# Patient Record
Sex: FEMALE | NOT HISPANIC OR LATINO | URBAN - METROPOLITAN AREA
[De-identification: names, ages, dates, MRNs, and addresses within clinical notes are randomized per-mention and may not be internally consistent; named-entity substitution may affect disease eponyms.]

---

## 2024-10-21 ENCOUNTER — APPOINTMENT (OUTPATIENT)
Dept: PRIMARY CARE | Facility: CLINIC | Age: 54
End: 2024-10-21
Payer: COMMERCIAL

## 2024-10-23 ENCOUNTER — OFFICE VISIT (OUTPATIENT)
Dept: PRIMARY CARE | Facility: CLINIC | Age: 54
End: 2024-10-23
Payer: COMMERCIAL

## 2024-10-23 VITALS
RESPIRATION RATE: 16 BRPM | OXYGEN SATURATION: 97 % | TEMPERATURE: 95.1 F | SYSTOLIC BLOOD PRESSURE: 158 MMHG | BODY MASS INDEX: 30.62 KG/M2 | DIASTOLIC BLOOD PRESSURE: 100 MMHG | HEART RATE: 81 BPM | WEIGHT: 202 LBS | HEIGHT: 68 IN

## 2024-10-23 DIAGNOSIS — Z00.00 ROUTINE GENERAL MEDICAL EXAMINATION AT A HEALTH CARE FACILITY: ICD-10-CM

## 2024-10-23 DIAGNOSIS — E66.09 CLASS 1 OBESITY DUE TO EXCESS CALORIES WITH SERIOUS COMORBIDITY AND BODY MASS INDEX (BMI) OF 30.0 TO 30.9 IN ADULT: ICD-10-CM

## 2024-10-23 DIAGNOSIS — E66.811 CLASS 1 OBESITY DUE TO EXCESS CALORIES WITH SERIOUS COMORBIDITY AND BODY MASS INDEX (BMI) OF 30.0 TO 30.9 IN ADULT: ICD-10-CM

## 2024-10-23 DIAGNOSIS — Z00.00 ROUTINE MEDICAL EXAM: Primary | ICD-10-CM

## 2024-10-23 DIAGNOSIS — R00.2 PALPITATIONS: ICD-10-CM

## 2024-10-23 DIAGNOSIS — I10 PRIMARY HYPERTENSION: ICD-10-CM

## 2024-10-23 LAB
ALBUMIN SERPL BCP-MCNC: 4.4 G/DL (ref 3.4–5)
ALP SERPL-CCNC: 97 U/L (ref 33–110)
ALT SERPL W P-5'-P-CCNC: 17 U/L (ref 7–45)
ANION GAP SERPL CALCULATED.3IONS-SCNC: 9 MMOL/L (ref 10–20)
AST SERPL W P-5'-P-CCNC: 16 U/L (ref 9–39)
BASOPHILS # BLD AUTO: 0.04 X10*3/UL (ref 0–0.1)
BASOPHILS NFR BLD AUTO: 0.7 %
BILIRUB SERPL-MCNC: 0.6 MG/DL (ref 0–1.2)
BUN SERPL-MCNC: 15 MG/DL (ref 6–23)
CALCIUM SERPL-MCNC: 10.1 MG/DL (ref 8.6–10.3)
CHLORIDE SERPL-SCNC: 105 MMOL/L (ref 98–107)
CHOLEST SERPL-MCNC: 188 MG/DL (ref 0–199)
CHOLEST/HDLC SERPL: 3.7 {RATIO}
CO2 SERPL-SCNC: 30 MMOL/L (ref 21–32)
CREAT SERPL-MCNC: 0.71 MG/DL (ref 0.5–1.05)
EGFRCR SERPLBLD CKD-EPI 2021: >90 ML/MIN/1.73M*2
EOSINOPHIL # BLD AUTO: 0.22 X10*3/UL (ref 0–0.7)
EOSINOPHIL NFR BLD AUTO: 3.8 %
ERYTHROCYTE [DISTWIDTH] IN BLOOD BY AUTOMATED COUNT: 12.7 % (ref 11.5–14.5)
GLUCOSE SERPL-MCNC: 96 MG/DL (ref 74–99)
HCT VFR BLD AUTO: 47.2 % (ref 36–46)
HDLC SERPL-MCNC: 50.8 MG/DL
HGB BLD-MCNC: 15.7 G/DL (ref 12–16)
IMM GRANULOCYTES # BLD AUTO: 0.01 X10*3/UL (ref 0–0.7)
IMM GRANULOCYTES NFR BLD AUTO: 0.2 % (ref 0–0.9)
LDLC SERPL CALC-MCNC: 113 MG/DL
LYMPHOCYTES # BLD AUTO: 1.57 X10*3/UL (ref 1.2–4.8)
LYMPHOCYTES NFR BLD AUTO: 27.4 %
MCH RBC QN AUTO: 30.6 PG (ref 26–34)
MCHC RBC AUTO-ENTMCNC: 33.3 G/DL (ref 32–36)
MCV RBC AUTO: 92 FL (ref 80–100)
MONOCYTES # BLD AUTO: 0.42 X10*3/UL (ref 0.1–1)
MONOCYTES NFR BLD AUTO: 7.3 %
NEUTROPHILS # BLD AUTO: 3.47 X10*3/UL (ref 1.2–7.7)
NEUTROPHILS NFR BLD AUTO: 60.6 %
NON HDL CHOLESTEROL: 137 MG/DL (ref 0–149)
NRBC BLD-RTO: 0 /100 WBCS (ref 0–0)
PLATELET # BLD AUTO: 237 X10*3/UL (ref 150–450)
POTASSIUM SERPL-SCNC: 4.1 MMOL/L (ref 3.5–5.3)
PROT SERPL-MCNC: 6.8 G/DL (ref 6.4–8.2)
RBC # BLD AUTO: 5.13 X10*6/UL (ref 4–5.2)
SODIUM SERPL-SCNC: 140 MMOL/L (ref 136–145)
TRIGL SERPL-MCNC: 123 MG/DL (ref 0–149)
TSH SERPL-ACNC: 0.78 MIU/L (ref 0.44–3.98)
VLDL: 25 MG/DL (ref 0–40)
WBC # BLD AUTO: 5.7 X10*3/UL (ref 4.4–11.3)

## 2024-10-23 PROCEDURE — 3080F DIAST BP >= 90 MM HG: CPT | Performed by: FAMILY MEDICINE

## 2024-10-23 PROCEDURE — 80061 LIPID PANEL: CPT | Performed by: FAMILY MEDICINE

## 2024-10-23 PROCEDURE — 84443 ASSAY THYROID STIM HORMONE: CPT | Performed by: FAMILY MEDICINE

## 2024-10-23 PROCEDURE — 80053 COMPREHEN METABOLIC PANEL: CPT | Performed by: FAMILY MEDICINE

## 2024-10-23 PROCEDURE — 99203 OFFICE O/P NEW LOW 30 MIN: CPT | Performed by: FAMILY MEDICINE

## 2024-10-23 PROCEDURE — 99386 PREV VISIT NEW AGE 40-64: CPT | Performed by: FAMILY MEDICINE

## 2024-10-23 PROCEDURE — 3077F SYST BP >= 140 MM HG: CPT | Performed by: FAMILY MEDICINE

## 2024-10-23 PROCEDURE — 85025 COMPLETE CBC W/AUTO DIFF WBC: CPT | Performed by: FAMILY MEDICINE

## 2024-10-23 PROCEDURE — 36415 COLL VENOUS BLD VENIPUNCTURE: CPT | Performed by: FAMILY MEDICINE

## 2024-10-23 PROCEDURE — 3008F BODY MASS INDEX DOCD: CPT | Performed by: FAMILY MEDICINE

## 2024-10-23 RX ORDER — ALBUTEROL SULFATE 90 UG/1
INHALANT RESPIRATORY (INHALATION)
COMMUNITY
Start: 2024-04-11

## 2024-10-23 RX ORDER — DILTIAZEM HYDROCHLORIDE 240 MG/1
240 CAPSULE, COATED, EXTENDED RELEASE ORAL DAILY
COMMUNITY
Start: 2023-05-16 | End: 2024-10-23 | Stop reason: WASHOUT

## 2024-10-23 RX ORDER — LOSARTAN POTASSIUM 50 MG/1
50 TABLET ORAL DAILY
Qty: 30 TABLET | Refills: 11 | Status: SHIPPED | OUTPATIENT
Start: 2024-10-23 | End: 2025-10-23

## 2024-10-23 ASSESSMENT — ENCOUNTER SYMPTOMS
OCCASIONAL FEELINGS OF UNSTEADINESS: 0
NEUROLOGICAL NEGATIVE: 1
DEPRESSION: 0
CARDIOVASCULAR NEGATIVE: 1
MUSCULOSKELETAL NEGATIVE: 1
GASTROINTESTINAL NEGATIVE: 1
CONSTITUTIONAL NEGATIVE: 1
RESPIRATORY NEGATIVE: 1
LOSS OF SENSATION IN FEET: 0

## 2024-10-23 ASSESSMENT — COLUMBIA-SUICIDE SEVERITY RATING SCALE - C-SSRS
2. HAVE YOU ACTUALLY HAD ANY THOUGHTS OF KILLING YOURSELF?: NO
6. HAVE YOU EVER DONE ANYTHING, STARTED TO DO ANYTHING, OR PREPARED TO DO ANYTHING TO END YOUR LIFE?: NO
1. IN THE PAST MONTH, HAVE YOU WISHED YOU WERE DEAD OR WISHED YOU COULD GO TO SLEEP AND NOT WAKE UP?: NO

## 2024-10-23 ASSESSMENT — PATIENT HEALTH QUESTIONNAIRE - PHQ9
2. FEELING DOWN, DEPRESSED OR HOPELESS: NOT AT ALL
1. LITTLE INTEREST OR PLEASURE IN DOING THINGS: NOT AT ALL
SUM OF ALL RESPONSES TO PHQ9 QUESTIONS 1 AND 2: 0

## 2024-10-23 ASSESSMENT — PAIN SCALES - GENERAL: PAINLEVEL_OUTOF10: 0-NO PAIN

## 2024-10-23 NOTE — PROGRESS NOTES
"Subjective   Patient ID: Makenna Reyes is a 54 y.o. female who presents for New Patient Visit (Pt is here to establish care and has concerns of hypertension.).    HPI no chest pain or sob.  She has numerous elevated bp readings recently.      Review of Systems   Constitutional: Negative.    HENT: Negative.     Respiratory: Negative.     Cardiovascular: Negative.    Gastrointestinal: Negative.    Genitourinary: Negative.    Musculoskeletal: Negative.    Neurological: Negative.        Objective   BP (!) 158/100 (BP Location: Left arm, Patient Position: Sitting, BP Cuff Size: Adult)   Pulse 81   Temp 35.1 °C (95.1 °F) (Temporal)   Resp 16   Ht 1.727 m (5' 8\")   Wt 91.6 kg (202 lb)   SpO2 97%   BMI 30.71 kg/m²     Physical Exam  Vitals and nursing note reviewed.   Constitutional:       General: She is not in acute distress.  HENT:      Right Ear: Tympanic membrane and ear canal normal.      Left Ear: Tympanic membrane and ear canal normal.      Nose: Nose normal. No rhinorrhea.      Mouth/Throat:      Pharynx: Oropharynx is clear. No oropharyngeal exudate or posterior oropharyngeal erythema.      Comments: Dentition wnl  Eyes:      Extraocular Movements: Extraocular movements intact.      Conjunctiva/sclera: Conjunctivae normal.      Pupils: Pupils are equal, round, and reactive to light.   Neck:      Vascular: No carotid bruit.   Cardiovascular:      Rate and Rhythm: Normal rate and regular rhythm.      Heart sounds: Normal heart sounds. No murmur heard.  Pulmonary:      Breath sounds: Normal breath sounds. No wheezing or rhonchi.   Abdominal:      General: Bowel sounds are normal. There is no distension.      Palpations: Abdomen is soft. There is no mass.      Tenderness: There is no abdominal tenderness. There is no guarding or rebound.      Hernia: No hernia is present.   Musculoskeletal:         General: No swelling or tenderness. Normal range of motion.      Cervical back: Normal range of motion and neck " supple.   Lymphadenopathy:      Cervical: No cervical adenopathy.   Skin:     General: Skin is warm.      Findings: No rash.   Neurological:      General: No focal deficit present.      Mental Status: She is alert.         Assessment/Plan   Problem List Items Addressed This Visit    None  Visit Diagnoses         Codes    Routine medical exam    -  Primary Z00.00    Relevant Orders    CBC and Auto Differential (Completed)    Comprehensive Metabolic Panel (Completed)    TSH with reflex to Free T4 if abnormal (Completed)    Lipid Panel (Completed)    CT cardiac scoring wo IV contrast    Routine general medical examination at a health care facility     Z00.00    Primary hypertension     I10    Relevant Medications    losartan (Cozaar) 50 mg tablet    Other Relevant Orders    Referral to Nutrition Services    Referral to Cardiology    CBC and Auto Differential (Completed)    Comprehensive Metabolic Panel (Completed)    Lipid Panel (Completed)    Palpitations     R00.2    Relevant Orders    Referral to Cardiology    TSH with reflex to Free T4 if abnormal (Completed)    Class 1 obesity due to excess calories with serious comorbidity and body mass index (BMI) of 30.0 to 30.9 in adult     E66.811, E66.09, Z68.30    Relevant Orders    Referral to Nutrition Services        Work on good diet and wt loss and will set up with dietary consult.  Keep bp log and recheck 4-6 wks with log

## 2024-11-05 ENCOUNTER — APPOINTMENT (OUTPATIENT)
Dept: RADIOLOGY | Facility: CLINIC | Age: 54
End: 2024-11-05
Payer: COMMERCIAL

## 2024-11-08 ENCOUNTER — APPOINTMENT (OUTPATIENT)
Dept: NEPHROLOGY | Facility: CLINIC | Age: 54
End: 2024-11-08
Payer: COMMERCIAL

## 2024-11-19 ENCOUNTER — HOSPITAL ENCOUNTER (OUTPATIENT)
Dept: RADIOLOGY | Facility: HOSPITAL | Age: 54
Discharge: HOME | End: 2024-11-19
Payer: COMMERCIAL

## 2024-11-19 DIAGNOSIS — Z00.00 ROUTINE MEDICAL EXAM: ICD-10-CM

## 2024-11-19 PROCEDURE — 75571 CT HRT W/O DYE W/CA TEST: CPT

## 2024-11-22 ENCOUNTER — APPOINTMENT (OUTPATIENT)
Dept: RADIOLOGY | Facility: HOSPITAL | Age: 54
End: 2024-11-22
Payer: COMMERCIAL

## 2024-12-02 ENCOUNTER — APPOINTMENT (OUTPATIENT)
Dept: PRIMARY CARE | Facility: CLINIC | Age: 54
End: 2024-12-02
Payer: COMMERCIAL

## 2024-12-04 ENCOUNTER — APPOINTMENT (OUTPATIENT)
Dept: RADIOLOGY | Facility: CLINIC | Age: 54
End: 2024-12-04
Payer: COMMERCIAL

## 2024-12-13 ENCOUNTER — APPOINTMENT (OUTPATIENT)
Dept: PRIMARY CARE | Facility: CLINIC | Age: 54
End: 2024-12-13
Payer: COMMERCIAL

## 2024-12-13 ENCOUNTER — HOSPITAL ENCOUNTER (OUTPATIENT)
Dept: RADIOLOGY | Facility: CLINIC | Age: 54
Discharge: HOME | End: 2024-12-13
Payer: COMMERCIAL

## 2024-12-13 VITALS — HEIGHT: 68 IN | WEIGHT: 202 LBS | BODY MASS INDEX: 30.62 KG/M2

## 2024-12-13 DIAGNOSIS — Z12.31 ENCOUNTER FOR SCREENING MAMMOGRAM FOR MALIGNANT NEOPLASM OF BREAST: ICD-10-CM

## 2024-12-13 PROCEDURE — 77063 BREAST TOMOSYNTHESIS BI: CPT

## 2024-12-19 ENCOUNTER — APPOINTMENT (OUTPATIENT)
Dept: NUTRITION | Facility: HOSPITAL | Age: 54
End: 2024-12-19
Payer: COMMERCIAL

## 2024-12-20 ENCOUNTER — OFFICE VISIT (OUTPATIENT)
Dept: PRIMARY CARE | Facility: CLINIC | Age: 54
End: 2024-12-20
Payer: COMMERCIAL

## 2024-12-20 VITALS
TEMPERATURE: 97.5 F | RESPIRATION RATE: 16 BRPM | OXYGEN SATURATION: 97 % | HEIGHT: 68 IN | WEIGHT: 205 LBS | HEART RATE: 71 BPM | BODY MASS INDEX: 31.07 KG/M2 | DIASTOLIC BLOOD PRESSURE: 110 MMHG | SYSTOLIC BLOOD PRESSURE: 168 MMHG

## 2024-12-20 DIAGNOSIS — I10 PRIMARY HYPERTENSION: Primary | ICD-10-CM

## 2024-12-20 DIAGNOSIS — Z23 ENCOUNTER FOR IMMUNIZATION: ICD-10-CM

## 2024-12-20 PROCEDURE — 3080F DIAST BP >= 90 MM HG: CPT | Performed by: FAMILY MEDICINE

## 2024-12-20 PROCEDURE — 90656 IIV3 VACC NO PRSV 0.5 ML IM: CPT | Performed by: FAMILY MEDICINE

## 2024-12-20 PROCEDURE — 90471 IMMUNIZATION ADMIN: CPT | Performed by: FAMILY MEDICINE

## 2024-12-20 PROCEDURE — 1036F TOBACCO NON-USER: CPT | Performed by: FAMILY MEDICINE

## 2024-12-20 PROCEDURE — 3008F BODY MASS INDEX DOCD: CPT | Performed by: FAMILY MEDICINE

## 2024-12-20 PROCEDURE — 90677 PCV20 VACCINE IM: CPT | Performed by: FAMILY MEDICINE

## 2024-12-20 PROCEDURE — 3077F SYST BP >= 140 MM HG: CPT | Performed by: FAMILY MEDICINE

## 2024-12-20 PROCEDURE — 90472 IMMUNIZATION ADMIN EACH ADD: CPT | Performed by: FAMILY MEDICINE

## 2024-12-20 PROCEDURE — 99213 OFFICE O/P EST LOW 20 MIN: CPT | Performed by: FAMILY MEDICINE

## 2024-12-20 PROCEDURE — 90715 TDAP VACCINE 7 YRS/> IM: CPT | Performed by: FAMILY MEDICINE

## 2024-12-20 RX ORDER — LOSARTAN POTASSIUM 50 MG/1
100 TABLET ORAL DAILY
Qty: 90 TABLET | Refills: 1 | Status: SHIPPED | OUTPATIENT
Start: 2024-12-20 | End: 2025-12-20

## 2024-12-20 ASSESSMENT — ENCOUNTER SYMPTOMS
CONSTITUTIONAL NEGATIVE: 1
LOSS OF SENSATION IN FEET: 0
MUSCULOSKELETAL NEGATIVE: 1
GASTROINTESTINAL NEGATIVE: 1
DEPRESSION: 0
RESPIRATORY NEGATIVE: 1
CARDIOVASCULAR NEGATIVE: 1
NEUROLOGICAL NEGATIVE: 1
OCCASIONAL FEELINGS OF UNSTEADINESS: 0

## 2024-12-20 ASSESSMENT — COLUMBIA-SUICIDE SEVERITY RATING SCALE - C-SSRS
1. IN THE PAST MONTH, HAVE YOU WISHED YOU WERE DEAD OR WISHED YOU COULD GO TO SLEEP AND NOT WAKE UP?: NO
2. HAVE YOU ACTUALLY HAD ANY THOUGHTS OF KILLING YOURSELF?: NO
6. HAVE YOU EVER DONE ANYTHING, STARTED TO DO ANYTHING, OR PREPARED TO DO ANYTHING TO END YOUR LIFE?: NO

## 2024-12-20 ASSESSMENT — PAIN SCALES - GENERAL: PAINLEVEL_OUTOF10: 0-NO PAIN

## 2024-12-20 NOTE — PROGRESS NOTES
"Subjective   Patient ID: Makenna Reyes is a 54 y.o. female who presents for Follow-up (Pt is here for a follow up of bp.).    HPI she does not check her bps and does not exercise. She does take her meds mostly.  She has dietary consult upcoming.     Review of Systems   Constitutional: Negative.    HENT: Negative.     Respiratory: Negative.     Cardiovascular: Negative.    Gastrointestinal: Negative.    Genitourinary: Negative.    Musculoskeletal: Negative.    Neurological: Negative.        Objective   BP (!) 168/110 (BP Location: Left arm, Patient Position: Sitting, BP Cuff Size: Adult)   Pulse 71   Temp 36.4 °C (97.5 °F) (Temporal)   Resp 16   Ht 1.727 m (5' 8\")   Wt 93 kg (205 lb)   SpO2 97%   BMI 31.17 kg/m²     Physical Exam  Constitutional:       General: She is not in acute distress.     Appearance: Normal appearance.   Cardiovascular:      Rate and Rhythm: Normal rate and regular rhythm.      Heart sounds: No murmur heard.  Pulmonary:      Breath sounds: Normal breath sounds. No wheezing.   Neurological:      Mental Status: She is alert.         Assessment/Plan   Problem List Items Addressed This Visit    None  Visit Diagnoses         Codes    Encounter for immunization     Z23    Relevant Orders    Flu vaccine, trivalent, preservative free, age 6 months and greater (Fluarix/Fluzone/Flulaval)    Primary hypertension     I10    Relevant Medications    losartan (Cozaar) 50 mg tablet        Discussed importance bp checks and exercise.  Keep log and recheck 2 months.  Increase to 100 mg.         "

## 2024-12-24 ENCOUNTER — APPOINTMENT (OUTPATIENT)
Age: 54
End: 2024-12-24
Payer: COMMERCIAL

## 2024-12-24 VITALS
TEMPERATURE: 98.6 F | OXYGEN SATURATION: 97 % | HEART RATE: 87 BPM | WEIGHT: 204 LBS | BODY MASS INDEX: 30.92 KG/M2 | RESPIRATION RATE: 16 BRPM | HEIGHT: 68 IN | SYSTOLIC BLOOD PRESSURE: 138 MMHG | DIASTOLIC BLOOD PRESSURE: 60 MMHG

## 2024-12-24 DIAGNOSIS — R93.1 AGATSTON CAC SCORE, <100: Primary | ICD-10-CM

## 2024-12-24 DIAGNOSIS — R00.2 PALPITATIONS: ICD-10-CM

## 2024-12-24 DIAGNOSIS — I10 ESSENTIAL HYPERTENSION: ICD-10-CM

## 2024-12-24 DIAGNOSIS — I10 PRIMARY HYPERTENSION: ICD-10-CM

## 2024-12-24 PROCEDURE — 3078F DIAST BP <80 MM HG: CPT | Performed by: INTERNAL MEDICINE

## 2024-12-24 PROCEDURE — 3075F SYST BP GE 130 - 139MM HG: CPT | Performed by: INTERNAL MEDICINE

## 2024-12-24 PROCEDURE — 99204 OFFICE O/P NEW MOD 45 MIN: CPT | Performed by: INTERNAL MEDICINE

## 2024-12-24 PROCEDURE — 93000 ELECTROCARDIOGRAM COMPLETE: CPT | Performed by: INTERNAL MEDICINE

## 2024-12-24 PROCEDURE — 3008F BODY MASS INDEX DOCD: CPT | Performed by: INTERNAL MEDICINE

## 2024-12-24 PROCEDURE — 1036F TOBACCO NON-USER: CPT | Performed by: INTERNAL MEDICINE

## 2024-12-24 ASSESSMENT — LIFESTYLE VARIABLES
HAVE YOU OR SOMEONE ELSE BEEN INJURED AS A RESULT OF YOUR DRINKING: NO
HOW OFTEN DO YOU HAVE SIX OR MORE DRINKS ON ONE OCCASION: NEVER
AUDIT TOTAL SCORE: 0
HAS A RELATIVE, FRIEND, DOCTOR, OR ANOTHER HEALTH PROFESSIONAL EXPRESSED CONCERN ABOUT YOUR DRINKING OR SUGGESTED YOU CUT DOWN: NO
HOW OFTEN DO YOU HAVE A DRINK CONTAINING ALCOHOL: NEVER
HOW MANY STANDARD DRINKS CONTAINING ALCOHOL DO YOU HAVE ON A TYPICAL DAY: PATIENT DOES NOT DRINK
SKIP TO QUESTIONS 9-10: 1
AUDIT-C TOTAL SCORE: 0

## 2024-12-24 ASSESSMENT — ENCOUNTER SYMPTOMS
DEPRESSION: 0
LOSS OF SENSATION IN FEET: 0
OCCASIONAL FEELINGS OF UNSTEADINESS: 0

## 2024-12-24 ASSESSMENT — PAIN SCALES - GENERAL: PAINLEVEL_OUTOF10: 0-NO PAIN

## 2024-12-24 ASSESSMENT — PATIENT HEALTH QUESTIONNAIRE - PHQ9
SUM OF ALL RESPONSES TO PHQ9 QUESTIONS 1 AND 2: 0
2. FEELING DOWN, DEPRESSED OR HOPELESS: NOT AT ALL
1. LITTLE INTEREST OR PLEASURE IN DOING THINGS: NOT AT ALL

## 2024-12-24 NOTE — ASSESSMENT & PLAN NOTE
Commit to continued exercise/walking along with low carbohydrate and low sugar Mediterranean/[employer] dietary lifestyle for cardiac risk reduction and hypertension management    Echocardiogram in 2 months to assess for any underlying structural heart disease since previous chest x-rays that suggested mild cardiomegaly.

## 2024-12-24 NOTE — PROGRESS NOTES
Subjective      Chief Complaint   Patient presents with    [facility] Care     Mrs\ Ms. Reyes is present to establish relationship with Dr. Quan for Cardiac Eval and Treat after referral from Hosp staff         Referred to our office by her primary care physician for hypertension management.  She is now on modest dose of losartan    She has no history of previous myocardial infarction, heart failure, valvular heart disease, syncope or sustained arrhythmias.    EKG shows sinus rhythm rate 76 bpm, baseline artifact, left axis deviation, and a QTc 456ms.         Review of Systems   All other systems reviewed and are negative.       Objective   Physical Exam  Constitutional:       Appearance: Normal appearance.   HENT:      Head: Normocephalic and atraumatic.   Eyes:      Pupils: Pupils are equal, round, and reactive to light.   Cardiovascular:      Rate and Rhythm: Normal rate and regular rhythm.      Pulses: Normal pulses.      Heart sounds: Normal heart sounds.   Pulmonary:      Effort: Pulmonary effort is normal.      Breath sounds: Normal breath sounds.   Abdominal:      General: Abdomen is flat. Bowel sounds are normal.      Palpations: Abdomen is soft.   Musculoskeletal:         General: Normal range of motion.      Cervical back: Normal range of motion.   Skin:     General: Skin is warm and dry.   Neurological:      General: No focal deficit present.   Psychiatric:         Mood and Affect: Mood normal.         Judgment: Judgment normal.          Lab Review:   Not applicable    Agatston CAC score, <100  Coronary calcium score November 2024 showed a score of 0, which is essentially at 0% 10-year risk of coronary ischemic events.    Essential hypertension  Commit to continued exercise/walking along with low carbohydrate and low sugar Mediterranean/[place] dietary lifestyle for cardiac risk reduction and hypertension management    Echocardiogram in 2 months to assess for any underlying structural heart disease  since previous chest x-rays that suggested mild cardiomegaly.

## 2024-12-24 NOTE — ASSESSMENT & PLAN NOTE
Coronary calcium score November 2024 showed a score of 0, which is essentially at 0% 10-year risk of coronary ischemic events.

## 2024-12-26 ENCOUNTER — TELEMEDICINE CLINICAL SUPPORT (OUTPATIENT)
Dept: NUTRITION | Facility: HOSPITAL | Age: 54
End: 2024-12-26
Payer: COMMERCIAL

## 2024-12-26 DIAGNOSIS — E66.811 CLASS 1 OBESITY DUE TO EXCESS CALORIES WITH SERIOUS COMORBIDITY AND BODY MASS INDEX (BMI) OF 30.0 TO 30.9 IN ADULT: ICD-10-CM

## 2024-12-26 DIAGNOSIS — E66.09 CLASS 1 OBESITY DUE TO EXCESS CALORIES WITH SERIOUS COMORBIDITY AND BODY MASS INDEX (BMI) OF 30.0 TO 30.9 IN ADULT: ICD-10-CM

## 2024-12-26 DIAGNOSIS — I10 PRIMARY HYPERTENSION: Primary | ICD-10-CM

## 2024-12-26 PROCEDURE — 97802 MEDICAL NUTRITION INDIV IN: CPT

## 2024-12-26 NOTE — PATIENT INSTRUCTIONS
Follow the Healthy Plate Method at meals- see handout.  This will help to increase your vegetable intake and decrease portion sizes of carbohydrates.  When eating starchy foods, try to eat whole grains like potato with the skin, whole grain breads and cereals, brown rices and pastas.  Include protein with all meals and snacks.  Lean protein are better for cholesterol levels such as chicken(no skin), fish (baked or broiled or grilled), low-fat dairy, eggs, and peanut butter or nuts.   - Protein drinks between meals such as Premier Protein, Muscle Milk or Fairlife can help curb appetite.  4.   Reduce your weight by 1-2# per week to reach your goal weight.     5.   Increase fiber to 25-30g per day to help keep you gutierrez and lower cholesterol levels.  You may consider a fiber supplement such as Benefiber or Metamucil everyday.    6.   Aim to drink 64 oz of water daily  7.   Aim for 30 minutes of exercise on most days.

## 2024-12-26 NOTE — PROGRESS NOTES
"Nutrition Assessment     Reason for Visit:  Makenna Reyes is a 54 y.o. female who presents for HTN and wt loss. Pt currently works nights, states that this has caused a disrupted sleep schedule. She will often sleep in 1-4 hr increments only.    Anthropometrics:  Anthropometrics  IBW/kg (Dietitian Calculated): 64.5 kg  Weight Change  Weight History / % Weight Change: Per [facility] records, wt has been stable over the last 2 years  Significant Weight Loss: No    Daily Weight  12/24/24 : 92.5 kg (204 lb)  12/20/24 : 93 kg (205 lb)  12/13/24 : 91.6 kg (202 lb)  10/23/24 : 91.6 kg (202 lb)  04/11/24 : 93.6 kg (206 lb 5.6 oz)  10/25/22 : 90.7 kg (200 lb)  07/05/22 : 90.7 kg (200 lb)  05/03/22 : 90.7 kg (200 lb)     Food And Nutrient Intake:  Food and Nutrient History  Fluid Intake: Pepsi, coffee with cream and sugar, 1 bottle water daily.  Food Allergy: NKFA  Food Intolerance: \"A lot of time when I eat it goes right through me.\" With all foods, recent colonoscopy with no finding per pt.  GI Symptoms: none, diarrhea  GI Symptoms greater than 2 weeks: no  Oral Problems: denies, esophagitis (Esophagus stricture, has difficulty swallowing meats and breads. Drinking after each bite helps with this)  Sleep Duration/Quality: 1-4 hrs disrupted     Food Intake  Amount of Food: \"I don't have an eating schedule\"  Meal 1: 7-8 am: Coffee with cream and sugar, bowl of cereal  Meal 3: 8-9 pm: chicken or pork or pasta with meatballs and mashed potatoes and green beans. Occasional hamburger or hotdog  Snacks: Cookies, candt \"anything with sugar\"    Food Preparation  Cooking: Spouse/Significant Other  Grocery Shopping: Patient, Spouse/Significant Other  Dining Out: More than 3 times a week  Other: 5-7x week. Family diner or fast food ([employer], [employer], [employer]) \"I try to stay away from [employer].\" Occasionally [employer] or [employer].    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 No malnutrition suspected.    Energy Needs  Estimated Energy Needs  Total Energy Estimated Needs (kCal): 1500 kCal  Method for Estimating Needs: -500 kcal of needs for an approx 1 lb wt loss per week  Estimated Fluid Needs  Total Fluid Estimated Needs (mL): 2721 mL  Total Fluid Estimated Needs (mL/kg): 30 mL/kg  Estimated Protein Needs  Total Protein Estimated Needs (g):  (65-70)  Method for Estimating Needs: 1.0-1.1 g/kg IBW      Nutrition Diagnosis   Malnutrition Diagnosis  Patient has Malnutrition Diagnosis: No  Nutrition Diagnosis  Patient has Nutrition Diagnosis: Yes  Diagnosis Status (1): New  Nutrition Diagnosis 1: Excessive energy intake  Related to (1): Frequent consumption of energyh-dense foods  As Evidenced by (1): Estimated energy intake from typical day's intake > estimated needs      Nutrition Interventions/Recommendations   Food and Nutrition Delivery  Meals & Snacks: Carbohydrate-modified diet, Energy-modified diet, Fiber-modified diet, General Healthful Diet, Protein-modified diet, Modify schedule of foods/fluids, Modify Composition of Meals/Snacks  Nutrition Education  Nutrition Education Content: Content related nutrition education, Education on nutrition's influence on health, Physical activity guidance  Goals: Instructed on counting macronutrient intake, proper portion sizes, and general healthful nutrition. Discussed mindful eating, slow gradual wt loss and goals beyond wt. Instructed pt to not skip meals - discussed this  may lead to over eating later or snacking more than planned. Instructed on importance of physical activity for wt loss and maintenance of wt loss. Both verbal and written education provided in the one-on-one setting. Pt verbalized understanding of information provided. All questions answered to pt satisfaction throughout visit    Provided and discussed Healthy Eating Plate, NCM Planning Fast and Healthy Dinners, and Snack Ideas handouts.    Patient Instructions   Follow the Healthy Plate Method at meals- see handout.  This will help to increase your vegetable intake and decrease portion sizes of carbohydrates.  When eating starchy foods, try to eat whole grains like potato with the skin, whole grain breads and cereals, brown rices and pastas.  Include protein with all meals and snacks.  Lean protein are better for cholesterol levels such as chicken(no skin), fish (baked or broiled or grilled), low-fat dairy, eggs, and peanut butter or nuts.   - Protein drinks between meals such as Premier Protein, Muscle Milk or Fairlife can help curb appetite.  4.   Reduce your weight by 1-2# per week to reach your goal weight.     5.   Increase fiber to 25-30g per day to help keep you gutierrez and lower cholesterol levels.  You may consider a fiber supplement such as Benefiber or Metamucil everyday.    6.   Aim to drink 64 oz of water daily  7.   Aim for 30 minutes of exercise on most days.         Nutrition Monitoring and Evaluation   Food/Nutrient Related History Monitoring  Monitoring and Evaluation Plan: Energy intake, Meal/snack pattern, Fiber intake, Carbohydrate intake, Amount of food, Fluid intake, Protein intake  Criteria: 1500 kcal/day  Criteria: aim for at least 64 oz of water daily  Criteria: Aim for 3 meals/day with 1-2 snack  Meal/Snack Pattern: Food variety, Estimated meal and snack pattern  Criteria: Follow plate method when planning meals (1/2 plate non-starchy vegetables, 1/4 plate lean protein, 1/4 plate  carbohydrates).  Criteria: Choose lean protein sources including chicken, turkey, seafood, and eggs. Be sure to include protein with each meal and snack  Criteria: Limit added sugar to less than 25 g per day  Criteria: Increase fiber from fruits, vegetables, whole grains, and beans/lentils  Additional Plan: Provided pt with the following handouts: wt loss tips, 1200 calorie 5 day meal plan, choose your foods list, exercise, label reading, plate method  Knowledge/Beliefs/Attitudes  Monitoring and Evaluation Plan: Physical activity  Criteria: Encouraged regular physical activity including strength training as medically appropriate per AHA guidelines  Body Composition/Growth/Weight History  Monitoring and Evaluation Plan: Weight change  Weight Change: Weight change intent  Criteria: 1-2 lb wt loss per week          Time Spent  Prep time on day of patient encounter: 5 minutes  Time spent directly with patient, family or caregiver: 35 minutes ([id number] am)  Additional Time Spent on Patient Care Activities: 5 minutes  Documentation Time: 15 minutes  Other Time Spent: 0 minutes  Total: 60 minutes        Readiness to Change : Fair  Level of Understanding : Good  Anticipated Compliant : Fair

## 2025-01-06 ENCOUNTER — HOSPITAL ENCOUNTER (OUTPATIENT)
Dept: RADIOLOGY | Facility: HOSPITAL | Age: 55
Discharge: HOME | End: 2025-01-06
Payer: COMMERCIAL

## 2025-01-06 DIAGNOSIS — R92.8 OTHER ABNORMAL AND INCONCLUSIVE FINDINGS ON DIAGNOSTIC IMAGING OF BREAST: ICD-10-CM

## 2025-01-06 PROCEDURE — 77061 BREAST TOMOSYNTHESIS UNI: CPT | Mod: RT

## 2025-01-06 PROCEDURE — 76982 USE 1ST TARGET LESION: CPT | Mod: RT

## 2025-01-06 PROCEDURE — 76642 ULTRASOUND BREAST LIMITED: CPT | Mod: RT

## 2025-01-06 PROCEDURE — 77061 BREAST TOMOSYNTHESIS UNI: CPT | Mod: RIGHT SIDE | Performed by: RADIOLOGY

## 2025-01-06 PROCEDURE — 76642 ULTRASOUND BREAST LIMITED: CPT | Mod: RIGHT SIDE | Performed by: RADIOLOGY

## 2025-01-06 PROCEDURE — 77065 DX MAMMO INCL CAD UNI: CPT | Mod: RIGHT SIDE | Performed by: RADIOLOGY

## 2025-01-19 NOTE — PROGRESS NOTES
Makenna Reyes female   1970 54 y.o.   [id number]      Chief Complaint    Biopsy Consultation          HPI  Makenna Reyes is a 54 y.o.  female [facility]. She denies breast surgery or biopsy. She has no family history of breast or ovarian cancer.     She presents with her  Dean whose mother has breast cancer and is very concerned.     BREAST IMAGIN2024 bilateral screening mammogram at [facility], BI-RADS Category 0, right breast superolateral increased calcifications. 2025 right diagnostic mammogram and ultrasound at [facility], BI-RADS Category 4, right breast 11:00 7cm from nipple 0.9 x 0.8 x 0.6cm mass and right breast suspicious calcifications. (See PPF), additional imaging and biopsy of mass and calcifications recommended.     REPRODUCTIVE HISTORY: menarche age 13, , first birth age 32, , OCPs, surgical menopause age 48, no HRT, fatty breast tissue    FAMILY CANCER HISTORY:   Father: bladder cancer    REVIEW OF SYSTEMS    Constitutional:  Negative for appetite change, fatigue, fever and unexpected weight change.   HENT:  Negative for ear pain, hearing loss, nosebleeds, sore throat and trouble swallowing.    Eyes:  Negative for discharge, itching and visual disturbance.   Respiratory:  Negative for cough, chest tightness and shortness of breath.    Cardiovascular:  Negative for chest pain, palpitations and leg swelling.   Breast: as indicated in HPI  Gastrointestinal:  Negative for abdominal pain, constipation, diarrhea and nausea.   Endocrine: Negative for cold intolerance and heat intolerance.   Genitourinary:  Negative for dysuria, frequency, hematuria, pelvic pain and vaginal bleeding.   Musculoskeletal:  Negative for arthralgias, back pain, gait problem, joint swelling and myalgias.   Skin:  Negative for color change and rash.   Allergic/Immunologic: Negative for environmental allergies  and food allergies.   Neurological:  Negative for dizziness, tremors, speech difficulty, weakness, numbness and headaches.   Hematological:  Does not bruise/bleed easily.   Psychiatric/Behavioral:  Negative for agitation, dysphoric mood and sleep disturbance. The patient is not nervous/anxious.         MEDICATIONS  Current Outpatient Medications   Medication Instructions    albuterol 90 mcg/actuation inhaler 1 TO 2 INHALATIONS INHALATION EVERY 4 TO 6 HOURS AS NEEDED    losartan (COZAAR) 100 mg, oral, Daily        ALLERGIES  No Known Allergies     Patient Active Problem List    Diagnosis Date Noted    Agatston CAC score, <100 2024    Essential hypertension 2024     Past Medical History:   Diagnosis Date    GERD (gastroesophageal reflux disease)     Hypertension       Past Surgical History:   Procedure Laterality Date     SECTION, LOW TRANSVERSE  ,,    HYSTERECTOMY        Family History   Problem Relation Name Age of Onset    Epilepsy Mother      Other (bladder cancer) Father Chago Patel     Diabetes Father Chago Patel     Ovarian cancer Neg Hx      Breast cancer Neg Hx            SOCIAL HISTORY      Social History     Tobacco Use    Smoking status: Former     Current packs/day: 0.00     Average packs/day: 1.5 packs/day for 12.8 years (19.2 ttl pk-yrs)     Types: Cigarettes     Start date: 3/4/1991     Quit date: 2004     Years since quittin.7    Smokeless tobacco: Never   Substance Use Topics    Alcohol use: Never        VITALS  Vitals:    25 1306   BP: (!) 163/94   Pulse:    Resp:    Temp:         PHYSICAL EXAM  Patient is alert and oriented x3, with appropriate mood. The gait is steady and hand grasps are equal. Sclera clear. The right breast 11:00 8cm from nipple 2cm irregular mass. The tissue is soft without palpable abnormalities, discrete nodules or masses. The skin and nipples appear normal. There is no cervical, supraclavicular, or axillary  lymphadenopathy palpable. Heart rate and rhythm normal, S1 and S2 appreciated. The lungs are clear bilaterally. Abdomen is soft & non-tender.    Physical Exam  Chest:              IMAGING    Time was spent viewing digital images of the radiology testing with the patient.       ORDERS  Right breast ultrasound core biopsy  Right breast stereotactic core biopsy       ASSESSMENT/PLAN  1. Mass of upper outer quadrant of right breast        2. Breast calcification, right        3. Abnormal finding on breast imaging               Follow up for is to proceed to biopsy.  Proceed to biopsy. A breast radiology physician will perform the biopsy. Results are usually available in about 7 business days. I will call patient with results and instruct on next steps and plan.         Melvi Mireles, DOLORES-[facility]

## 2025-01-21 ENCOUNTER — OFFICE VISIT (OUTPATIENT)
Dept: SURGICAL ONCOLOGY | Facility: CLINIC | Age: 55
End: 2025-01-21
Payer: COMMERCIAL

## 2025-01-21 VITALS
WEIGHT: 201.3 LBS | BODY MASS INDEX: 31.6 KG/M2 | HEART RATE: 86 BPM | TEMPERATURE: 98.2 F | SYSTOLIC BLOOD PRESSURE: 163 MMHG | HEIGHT: 67 IN | RESPIRATION RATE: 17 BRPM | DIASTOLIC BLOOD PRESSURE: 94 MMHG

## 2025-01-21 DIAGNOSIS — R92.8 ABNORMAL FINDING ON BREAST IMAGING: ICD-10-CM

## 2025-01-21 DIAGNOSIS — N63.11 MASS OF UPPER OUTER QUADRANT OF RIGHT BREAST: Primary | ICD-10-CM

## 2025-01-21 DIAGNOSIS — R92.1 BREAST CALCIFICATION, RIGHT: ICD-10-CM

## 2025-01-21 PROCEDURE — 99204 OFFICE O/P NEW MOD 45 MIN: CPT | Performed by: NURSE PRACTITIONER

## 2025-01-21 PROCEDURE — 3077F SYST BP >= 140 MM HG: CPT | Performed by: NURSE PRACTITIONER

## 2025-01-21 PROCEDURE — 3008F BODY MASS INDEX DOCD: CPT | Performed by: NURSE PRACTITIONER

## 2025-01-21 PROCEDURE — 3080F DIAST BP >= 90 MM HG: CPT | Performed by: NURSE PRACTITIONER

## 2025-01-21 PROCEDURE — 1036F TOBACCO NON-USER: CPT | Performed by: NURSE PRACTITIONER

## 2025-01-21 PROCEDURE — 99214 OFFICE O/P EST MOD 30 MIN: CPT | Performed by: NURSE PRACTITIONER

## 2025-01-21 ASSESSMENT — PAIN SCALES - GENERAL: PAINLEVEL_OUTOF10: 0-NO PAIN

## 2025-01-21 NOTE — PATIENT INSTRUCTIONS
Thank you for coming to see me today at [facility]. I recommend biopsy. A breast radiology physician will perform the biopsy. Possible diagnoses include benign, atypical, or cancer as we discussed.  Bruising and mild discomfort after the biopsy is normal and will improve. I normally have results in 7-10 business days. I will call you with results, please have your phone handy to take my call.    IMPORTANT INFORMATION REGARDING YOUR RESULTS  If you receive medical information from My [facility] Personal Health Record (online chart) your results will be released into your chart. This means you may view or see results of your biopsy or procedure before I contact you directly. If this occurs, please call the office and we will discuss your results over the phone.     You can see your health information, review clinical summaries from office visits & test results online when you follow your health with MY  Chart, a personal health record. To sign up go to [email or web address]. If you need assistance with signing up or trouble getting into your account call [employer] Patient Line 24/7 at [phone].     Should you have any questions or concerns after biopsy, please do not hesitate to call my office at [phone]. If it has been more than a week since your biopsy was performed and you have not been given the results, please call my office [phone]. Thank you for choosing [facility] and trusting me as your healthcare provider. I am honored to be a provider on your health care team and I remain dedicated to helping you achieve your health goals.

## 2025-01-28 ENCOUNTER — HOSPITAL ENCOUNTER (OUTPATIENT)
Dept: RADIOLOGY | Facility: HOSPITAL | Age: 55
Discharge: HOME | End: 2025-01-28
Payer: COMMERCIAL

## 2025-01-28 DIAGNOSIS — N63.0 BREAST MASS: ICD-10-CM

## 2025-01-28 DIAGNOSIS — R92.8 OTHER ABNORMAL AND INCONCLUSIVE FINDINGS ON DIAGNOSTIC IMAGING OF BREAST: ICD-10-CM

## 2025-01-28 PROCEDURE — 19083 BX BREAST 1ST LESION US IMAG: CPT | Mod: RT

## 2025-01-28 PROCEDURE — 2720000007 HC OR 272 NO HCPCS

## 2025-01-28 PROCEDURE — 19083 BX BREAST 1ST LESION US IMAG: CPT | Mod: RIGHT SIDE | Performed by: RADIOLOGY

## 2025-01-28 PROCEDURE — 2780000003 HC OR 278 NO HCPCS

## 2025-01-28 PROCEDURE — 77065 DX MAMMO INCL CAD UNI: CPT | Mod: RIGHT SIDE | Performed by: RADIOLOGY

## 2025-01-28 PROCEDURE — A4648 IMPLANTABLE TISSUE MARKER: HCPCS

## 2025-01-28 PROCEDURE — 19081 BX BREAST 1ST LESION STRTCTC: CPT | Mod: RIGHT SIDE | Performed by: RADIOLOGY

## 2025-01-28 PROCEDURE — 19081 BX BREAST 1ST LESION STRTCTC: CPT | Mod: RT

## 2025-01-28 PROCEDURE — 2500000004 HC RX 250 GENERAL PHARMACY W/ HCPCS (ALT 636 FOR OP/ED): Performed by: RADIOLOGY

## 2025-01-28 RX ORDER — LIDOCAINE HYDROCHLORIDE 10 MG/ML
10 INJECTION, SOLUTION EPIDURAL; INFILTRATION; INTRACAUDAL; PERINEURAL ONCE
Status: CANCELLED | OUTPATIENT
Start: 2025-01-28 | End: 2025-01-28

## 2025-01-28 RX ORDER — LIDOCAINE HYDROCHLORIDE 10 MG/ML
10 INJECTION, SOLUTION EPIDURAL; INFILTRATION; INTRACAUDAL; PERINEURAL ONCE
Status: COMPLETED | OUTPATIENT
Start: 2025-01-28 | End: 2025-01-28

## 2025-01-28 RX ADMIN — LIDOCAINE HYDROCHLORIDE 11 ML: 10 INJECTION, SOLUTION EPIDURAL; INFILTRATION; INTRACAUDAL; PERINEURAL at 10:19

## 2025-01-28 RX ADMIN — LIDOCAINE HYDROCHLORIDE 11 ML: 10 INJECTION, SOLUTION EPIDURAL; INFILTRATION; INTRACAUDAL; PERINEURAL at 11:02

## 2025-01-28 ASSESSMENT — PAIN SCALES - GENERAL
PAINLEVEL_OUTOF10: 0 - NO PAIN

## 2025-02-12 LAB
LAB AP ASR DISCLAIMER: NORMAL
LABORATORY COMMENT REPORT: NORMAL
PATH REPORT.COMMENTS IMP SPEC: NORMAL
PATH REPORT.FINAL DX SPEC: NORMAL
PATH REPORT.GROSS SPEC: NORMAL
PATH REPORT.RELEVANT HX SPEC: NORMAL
PATH REPORT.TOTAL CANCER: NORMAL
PATHOLOGY SYNOPTIC REPORT: NORMAL
RESIDENT REVIEW: NORMAL

## 2025-02-13 ENCOUNTER — TELEPHONE (OUTPATIENT)
Dept: SURGERY | Facility: HOSPITAL | Age: 55
End: 2025-02-13
Payer: COMMERCIAL

## 2025-02-13 ENCOUNTER — TELEPHONE (OUTPATIENT)
Dept: SURGICAL ONCOLOGY | Facility: CLINIC | Age: 55
End: 2025-02-13
Payer: COMMERCIAL

## 2025-02-13 ENCOUNTER — TELEMEDICINE CLINICAL SUPPORT (OUTPATIENT)
Dept: NUTRITION | Facility: HOSPITAL | Age: 55
End: 2025-02-13
Payer: COMMERCIAL

## 2025-02-13 DIAGNOSIS — E66.09 CLASS 1 OBESITY DUE TO EXCESS CALORIES WITH SERIOUS COMORBIDITY AND BODY MASS INDEX (BMI) OF 30.0 TO 30.9 IN ADULT: Primary | ICD-10-CM

## 2025-02-13 DIAGNOSIS — E66.811 CLASS 1 OBESITY DUE TO EXCESS CALORIES WITH SERIOUS COMORBIDITY AND BODY MASS INDEX (BMI) OF 30.0 TO 30.9 IN ADULT: Primary | ICD-10-CM

## 2025-02-13 PROCEDURE — 97803 MED NUTRITION INDIV SUBSEQ: CPT | Mod: 95

## 2025-02-13 NOTE — TELEPHONE ENCOUNTER
Attempted to contact patient for scheduling surgical consultation however received voicemail. Message left, requesting return call with direct dial number provided.

## 2025-02-13 NOTE — PROGRESS NOTES
"Nutrition Assessment     Reason for Visit:  Makenna Reyes is a 54 y.o. female who presents for wt loss follow up. Pt reports that she received a call before appt informing her that cancer was found in recent breast biopsy.    Anthropometrics:  Anthropometrics  IBW/kg (Dietitian Calculated): 61.4 kg  Percent of IBW: 149 %  Weight Change  Weight History / % Weight Change: [facility] records show a 3 lb, or 1.5%, wt loss in 1 month  Significant Weight Loss: No    Daily Weight  01/21/25 : 91.3 kg (201 lb 4.8 oz)  12/24/24 : 92.5 kg (204 lb)  12/20/24 : 93 kg (205 lb)  12/13/24 : 91.6 kg (202 lb)  10/23/24 : 91.6 kg (202 lb)  04/11/24 : 93.6 kg (206 lb 5.6 oz)  10/25/22 : 90.7 kg (200 lb)  07/05/22 : 90.7 kg (200 lb)  05/03/22 : 90.7 kg (200 lb)      Food And Nutrient Intake:  Food and Nutrient History  Food and Nutrient History: Has switched to wheat bread and has been attempting to cut back on pop since we last spoke. Has been incorporating healthy plate method, eats vegetables everyday and is making an effort to buy more fruit. Pt believes that she is still drinking too much pop.  Energy Intake: Good > 75 %  Food Intolerance: \"A lot of time when I eat it goes right through me.\" With all foods, recent colonoscopy with no finding per pt.  Sleep Duration/Quality:  (Works nights)     Food Intake  Amount of Food: \"I don't have an eating schedule\"  Meal 1: 4 am: waffle or eggs, sometimes chips or sugary snacks. Coffee with no sugar creamer.  Meal 2: Pizza or salad or soup from a can  Meal 3: 7-8 pm: Spagetti or pork chops or hamburgers or hamburger helper  Snacks: Chips or cookies  Fluid Intake: Tries to drink 2 bottle of water per day, 2 bottles of pop. Coffee with less cream and sugar than before    Food Preparation  Dining Out: 1 to 3 times a week  Other: 1-2x per week. 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 Does not exercise outside of " work  Consistency: Yes    Nutrition Focused Physical Exam:  Deferred, phone appt. No malnutrition suspected     Energy Needs  Estimated Energy Needs  Total Energy Estimated Needs in 24 hours (kCal): 1500 kCal  Method for Estimating Needs: -500 kcal of needs for an approx 1 lb wt loss per week  Estimated Fluid Needs  Total Fluid Estimated Needs in 24 Hours (mL): 2721 mL  Total Fluid Estimated Needs in 24 hours (mL/kg): 30 mL/kg  Estimated Protein Needs  Total Protein Estimated Needs in 24 Hours (g):  (70-80)  Method for Estimating 24 Hour Protein Needs: 1.1-1.3 g/kg IBW      Nutrition Diagnosis   Malnutrition Diagnosis  Patient has Malnutrition Diagnosis: No  Nutrition Diagnosis  Patient has Nutrition Diagnosis: Yes  Diagnosis Status (1): Active  Nutrition Diagnosis 1: Excessive energy intake  Related to (1): Frequent consumption of energy-dense foods  As Evidenced by (1): Estimated energy intake from typical day's intake > estimated needs      Nutrition Interventions/Recommendations   Food and Nutrition Delivery  Meals & Snacks: General Healthful Diet, Energy-modified diet, Protein-modified diet  Goals: 1500 kcal diet; include protein foods at all meals and snacks; follow plate method at meals  Nutrition Education  Nutrition Education Content: Content related nutrition education, Education on nutrition's influence on health  Goals: Reenforced information from initial encounter. Discussed mindful eating, plate method, and imporance of protein foods for wt loss. Discussed that weight loss goals may need to be paused depending on cancer tx regimen chosen by pt and provider, informed pt of food and nutrition-related effects of tx.    Discussed and provided N[facility] High Protein Foods List, Maximizing Nutrition During Cancer Treatment, Planning Fast and Healthy Dinners, and Snack Ideas handouts with pt via email for future reference.      Nutrition Monitoring and Evaluation   Food and Nutrient Intake  Monitoring and  Evaluation Plan: Energy intake, Meal/snack pattern, Carbohydrate intake, Amount of food, Fluid intake, Protein intake  Criteria: aim for at least 64 oz of water daily  Criteria: Aim for 3 meals/day with 1-2 snack  Criteria: Follow plate method when planning meals (1/2 plate produce, 1/4 plate lean protein, 1/4 plate carbohydrates).  Criteria: Choose lean protein sources including chicken, turkey, seafood, and eggs. Be sure to include protein with each meal and snack  Criteria: Limit added sugar to less than 25 g per day. Increase fiber from fruits, vegetables, whole grains, and beans/lentils  Anthropometric measurements  Monitoring and Evaluation Plan: Weight change  Criteria: 1-2 lb wt loss per week or wt maintenance depending on goals of care          Time Spent  Prep time on day of patient encounter: 5 minutes  Time spent directly with patient, family or caregiver: 10 minutes ([id number])  Additional Time Spent on Patient Care Activities: 5 minutes  Documentation Time: 15 minutes  Other Time Spent: 0 minutes  Total: 35 minutes        Readiness to Change : Good  Level of Understanding : Good  Anticipated Compliant : Good

## 2025-02-13 NOTE — TELEPHONE ENCOUNTER
Result Communication    Resulted Orders   Surgical Pathology Exam   Result Value Ref Range    Case Report       Surgical Pathology                                Case: [id number]                                  Authorizing Provider:  Melvi Mireles, DOLORES-NAKUL  Collected:           01/28/2025 1021              Ordering Location:     [facility] Received:            01/28/2025 1103              Pathologist:           Kim Devlin MD, MS                                                            Specimens:   A) - BREAST CORE BIOPSY RIGHT, right breast 11:00 7cmfn                                             B) - BREAST CORE BIOPSY RIGHT, stereotactic guided right breast core biopsy                FINAL DIAGNOSIS       A. 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 See comment and biomarker report.    B. Right breast, stereotactic-guided core needle biopsy:  - Minute focus of atypical glandular cells, suspicious for invasive lobular carcinoma. See comment.               By the signature on this report, the individual or group listed as making the Final Interpretation/Diagnosis certifies that they have reviewed this case.       Comment       A. By immunohistochemistry, SMMHC and p63 do not highlight the presence of myoepithelial cells in the tumor cells, and E-cadherin shows intact membranous expression supporting the above diagnosis. In this limited sample, the tumor measures up to 5 mm in greatest dimension, supporting the above diagnosis. ER, SC, and HER2 are included with this report.    B. There is a very limited focus of atypical glandular cells measuring up to 0.4 mm. By immunohistochemistry, these atypical cells are positive for cytokeratin (AE1/AE3), and lack the membranous expression of E-cadherin. Biomarkers were attempted in this focus, but disappears on deeper levels. Therefore, while this is  suspicious for invasive lobular carcinoma, it is non-diagnostic. Clinico-pathologic correlation is suggested.     Parts A and B of this case were reviewed at the breast intradepartmental consensus conference via Zoom, with concordance.       Resident Review       The gross and/or microscopic findings were reviewed in conjunction with pathology resident, Shabnam Miller MD.       Case Summary Report       Breast Biomarker Reporting Template   BREAST BIOMARKER REPORTING TEMPLATE - A   Protocol posted: 12/13/2023         Test(s) Performed:            Estrogen Receptor (ER) Status:    Positive (greater than 10% of cells demonstrate nuclear positivity)           Percentage of Cells with Nuclear Positivity:    > 95 %          Average Intensity of Staining:    Strong         Test Type:    Food and Drug Administration (FDA) cleared (test / vendor): Roche CONFIRM anti-(ER) (SP1) Rabbit Monoclonal Primary Antibody, Roche Multimer Detection         Primary Antibody:    SP1         Scoring System:    No separate scoring system used       Test(s) Performed:            Progesterone Receptor (PgR) Status:    Positive           Percentage of Cells with Nuclear Positivity:    5 %          Average Intensity of Staining:    Weak         Test Type:    Food and Drug Administration (FDA) cleared (test / vendor): Roche CONFIRM anti-(IA) (1E2) Rabbit Monoclonal Primary Anitbody, Roche Multimer Detection         Primary Antibody:    1E2         Scoring System:    No separate scoring system used       Test(s) Performed:            HER2 by Immunohistochemistry:    Negative (Score 1+)         Test Type:    Food and Drug Administration (FDA) cleared (test / vendor): Roche Pathway anti-HER-2/jose (4B5) Rabbit Monoclonal Primary Antibody, Roche Multimer Detection         Primary Antibody:    4B5       Cold Ischemia and Fixation Times:    Meet requirements specified in latest version of the ASCO / CAP Guidelines       Testing Performed on Block  "Number(s):    [id number]       METHODS      Fixative:    Formalin       Image Analysis:    Not performed       Clinical History       N63.0 - Breast mass      Gross Description       A: Received in formalin, labeled with the patient´s name and hospital number and \" right breast 11:00 7 cm from\", are multiple irregular/cylindrical segments of yellow-white fatty soft tissue aggregating to 1.0 x 0.5 x 0.2 cm.The specimen is submitted in toto in 1 cassette.  [facility]    NOTE:    Ischemia time: not provided.  This specimen was placed into formalin at: 1021 1/28/25.  B: Received in formalin, labeled with the patient´s name and hospital number and \"stereotactic guided right breast core biopsy\", are multiple irregular/cylindrical segments of yellow-white fatty soft tissue aggregating to 3.2 x 1.6 x 0.4 cm.The specimen is submitted in toto in 5 cassettes.  [facility]    NOTE:    Ischemia time: not provided.  This specimen was placed into formalin at: 1109 1/28/2025.      Disclaimer       One or more of the reagents used to perform assays on this specimen MAY have contained components considered to be analyte specific reagents (ASR's).  ASR's have not been cleared or approved by the U.S. Food and Drug Administration.  These assays were developed and their performance characteristics determined by the Department of Pathology at [facility]. The FDA does not require this test to go through premarket FDA review. This test is used for clinical purposes. It should not be regarded as investigational or for research. This laboratory is certified under the Clinical Laboratory Improvement Amendments (CLIA) as qualified to perform high complexity clinical laboratory testing.  The assays were performed with appropriate positive and negative controls which stained appropriately.         11:17 AM      Results were successfully communicated with the patient and they acknowledged their understanding. Informed the " breast biopsy shows cancer ine one area and atypical cells in the other area. She will meet with surgeon and plan for care.

## 2025-02-14 PROBLEM — Z17.0 MALIGNANT NEOPLASM OF UPPER-OUTER QUADRANT OF RIGHT BREAST IN FEMALE, ESTROGEN RECEPTOR POSITIVE: Status: ACTIVE | Noted: 2025-02-14

## 2025-02-14 PROBLEM — C50.411 MALIGNANT NEOPLASM OF UPPER-OUTER QUADRANT OF RIGHT BREAST IN FEMALE, ESTROGEN RECEPTOR POSITIVE: Status: ACTIVE | Noted: 2025-02-14

## 2025-02-14 NOTE — PROGRESS NOTES
BREAST SURGICAL ONCOLOGY FOLLOW UP     Subjective   Makenna Reyes is a 54 y.o. female presents today for follow up with newly diagnosed carcinoma of the right breast.   She is referred by Dr. Chapman and Melvi Mireles, CNP.    She had a ultrasound core biopsy of a right breast 1 cm mass in the 11 o'clock position, 7 cm from the nipple and a stereotactic biopsy of a mass with calcifications in the right breast on 2025.  Axillary lymph nodes look normal on ultrasound  Pathology showed:     FINAL DIAGNOSIS   A. 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    ER >95%, NJ 5%, Her2 negative (1+)     B. Right breast, stereotactic-guided core needle biopsy:  - Minute focus of atypical glandular cells, suspicious for invasive lobular carcinoma. See comment.          Estimated size of mass/extent of disease: The ultrasound-guided core  biopsy of the mass at the 11 o'clock position measures 1.0 x 0.8 x  0.6 cm. The mass biopsied under stereotactic biopsy measures roughly  0.5 cm. It should be noted that there are associated  microcalcifications superimposed over both masses. Their distribution  is in a segmental fashion. The total span of the 2 masses and  calcifications is approximately 5.1 x 4.0 x 3.4 cm.     She is here today with her  to discuss these results and develop a treatment plan.      PMH: Hypertension  Non-smoker    BREAST IMAGIN2024 bilateral screening mammogram at [facility], BI-RADS Category 0, right breast superolateral increased calcifications. 2025 right diagnostic mammogram and ultrasound at [facility], BI-RADS Category 4, right breast 11:00 7cm from nipple 0.9 x 0.8 x 0.6cm mass and right breast suspicious calcifications. (See PPF), additional imaging and biopsy of mass and calcifications recommended.      REPRODUCTIVE HISTORY: menarche age 13, , first birth age 32,  , OCPs, surgical menopause age 48, no HRT, fatty breast tissue     FAMILY CANCER HISTORY:   Father: bladder cancer  Paternal grandfather with pancreatic cancer at 76  Paternal aunt with pancreatic cancer at 72  Maternal cousin with bladder cancer at 52         PHYSICAL EXAM:    General: Alert and oriented x 3.  Mood and affect are appropriate.  Lymph node exam shows no cervical, supraclavicular, or axillary lymphadenopathy.  Breast exam shows symmetric breasts bilaterally with no skin changes, no dominant masses and no nipple discharge in either breast.     Assessment/Plan     Clinical stage IA right breast cancer  -[id number]   Invasive ductal carcinoma, grade 1-2; ER> 95%, CA 5%, HER2 negative   1 cm mass in the right breast 11:00, 7 cm from the nipple.  The second mass biopsied was a mass with calcifications.  The 2 masses and calcifications span 5.1 cm.    We discussed surgical options for breast cancer, which would include a lumpectomy (remove just the cancer in the breast with a small amount of normal tissue around it called a margin), followed by radiation therapy (xray treatments to the breast for 4-6 weeks) or mastectomy (remove the entire breast) with or without reconstruction by a plastic surgeon. The survival rate after a lumpectomy with radiation or a mastectomy are the same.  The chance of a recurrence (the cancer coming back) is slightly higher with a lumpectomy (4-6%) than with a mastectomy (2%).    We have discussed the typical risks of radiation treatment. More common risks are 'sunburn' like changes in the breast, fatigue (feeling tired) and change in size of the breast. Much less common risks are skin cancers, rib fracture, damage to the heart (if the cancer is on the left side) and long-term skin changes. If you have radiation therapy, you will meet with a radiation oncologist who will discuss this more. Women over 70 years old who have small, non-aggressive (estrogen and progesterone  positive, Her2 negative) breast cancers may be able to omit radiation treatments.  We have discussed options for reconstruction briefly. If you choose to have reconstruction, you will meet with a plastic surgeon who will discuss this more.  If you are having a lumpectomy and the cancer is not able to be felt in your breast, a small magnetic marking clip called a Magseed will be placed in the breast to help locate the breast cancer tissue that will be removed at surgery. This is a minor procedure which will be done by a radiologist sometime before surgery. It is similar to having the marker placed when you had the biopsy.  We have discussed that the need for chemotherapy may not be determined until after you have surgery. Having a lumpectomy or a mastectomy will not change the decision of whether or nor chemotherapy is needed.   We also discussed proceeding with a sentinel lymph node biopsy. That means to remove a few lymph nodes under the arm to test for cancer.   The risks, benefits, and procedures of all of these were discussed, including bleeding, infection, need for additional surgery to get clear margins, scar tissue formation, deformity of the area, decreased function, mobility or strength of the arm, arm swelling, increased risk of infection in the arm, numbness, pain or burning under the arm and the risk of general anesthesia. We discussed typical recovery after surgery and the typical time until you can resume all activities. She understood this and all of her questions were answered.    I have discussed with the patient the pathophysiology of the disease process and the rationale for the planned surgery. I have explained the anticipated risks and possible complications, the incidences and consequences of those risks and complications, and the expected postoperative course. Alternatives have been discussed including the alternative of no surgery. The patient has been given the opportunity to ask questions  and all her questions have been answered to her satisfaction.  Individual shared decision making was implemented.    Surgery has been recommended. The risks, benefits and procedure have been reviewed with you today.   You may be scheduled for pre-surgical testing and detailed instructions will be given to you at that appointment.  The pathology results from your surgery should be available about 14 days after the procedure. I will call you with the results. If you do not hear from me within 21 days, please call the office at [phone] for your results.    Schedule right bracketed Magseed localized lumpectomy and right axillary sentinel lymph node biopsy    Shannan Cohen MD

## 2025-02-18 ENCOUNTER — OFFICE VISIT (OUTPATIENT)
Dept: SURGICAL ONCOLOGY | Facility: CLINIC | Age: 55
End: 2025-02-18
Payer: COMMERCIAL

## 2025-02-18 ENCOUNTER — LAB (OUTPATIENT)
Dept: LAB | Facility: HOSPITAL | Age: 55
End: 2025-02-18
Payer: COMMERCIAL

## 2025-02-18 VITALS
WEIGHT: 197.8 LBS | HEART RATE: 88 BPM | SYSTOLIC BLOOD PRESSURE: 153 MMHG | BODY MASS INDEX: 31.04 KG/M2 | HEIGHT: 67 IN | DIASTOLIC BLOOD PRESSURE: 91 MMHG | TEMPERATURE: 98.6 F | RESPIRATION RATE: 16 BRPM

## 2025-02-18 DIAGNOSIS — Z17.0 ESTROGEN RECEPTOR POSITIVE STATUS (ER+): ICD-10-CM

## 2025-02-18 DIAGNOSIS — C50.411 MALIGNANT NEOPLASM OF UPPER-OUTER QUADRANT OF RIGHT FEMALE BREAST: Primary | ICD-10-CM

## 2025-02-18 DIAGNOSIS — C50.411 MALIGNANT NEOPLASM OF UPPER-OUTER QUADRANT OF RIGHT BREAST IN FEMALE, ESTROGEN RECEPTOR POSITIVE: Primary | ICD-10-CM

## 2025-02-18 DIAGNOSIS — Z17.0 MALIGNANT NEOPLASM OF UPPER-OUTER QUADRANT OF RIGHT BREAST IN FEMALE, ESTROGEN RECEPTOR POSITIVE: Primary | ICD-10-CM

## 2025-02-18 LAB
ANION GAP SERPL CALCULATED.3IONS-SCNC: 9 MMOL/L (ref 10–20)
BUN SERPL-MCNC: 20 MG/DL (ref 6–23)
CALCIUM SERPL-MCNC: 9.8 MG/DL (ref 8.6–10.3)
CHLORIDE SERPL-SCNC: 104 MMOL/L (ref 98–107)
CO2 SERPL-SCNC: 32 MMOL/L (ref 21–32)
CREAT SERPL-MCNC: 0.77 MG/DL (ref 0.5–1.05)
EGFRCR SERPLBLD CKD-EPI 2021: >90 ML/MIN/1.73M*2
ERYTHROCYTE [DISTWIDTH] IN BLOOD BY AUTOMATED COUNT: 13.2 % (ref 11.5–14.5)
GLUCOSE SERPL-MCNC: 104 MG/DL (ref 74–99)
HCT VFR BLD AUTO: 47.9 % (ref 36–46)
HGB BLD-MCNC: 15.5 G/DL (ref 12–16)
MCH RBC QN AUTO: 30.3 PG (ref 26–34)
MCHC RBC AUTO-ENTMCNC: 32.4 G/DL (ref 32–36)
MCV RBC AUTO: 94 FL (ref 80–100)
NRBC BLD-RTO: 0 /100 WBCS (ref 0–0)
PLATELET # BLD AUTO: 257 X10*3/UL (ref 150–450)
POTASSIUM SERPL-SCNC: 4 MMOL/L (ref 3.5–5.3)
RBC # BLD AUTO: 5.11 X10*6/UL (ref 4–5.2)
SODIUM SERPL-SCNC: 141 MMOL/L (ref 136–145)
WBC # BLD AUTO: 7.3 X10*3/UL (ref 4.4–11.3)

## 2025-02-18 PROCEDURE — 3008F BODY MASS INDEX DOCD: CPT | Performed by: SURGERY

## 2025-02-18 PROCEDURE — 99215 OFFICE O/P EST HI 40 MIN: CPT | Performed by: SURGERY

## 2025-02-18 PROCEDURE — 3080F DIAST BP >= 90 MM HG: CPT | Performed by: SURGERY

## 2025-02-18 PROCEDURE — 80048 BASIC METABOLIC PNL TOTAL CA: CPT

## 2025-02-18 PROCEDURE — 85027 COMPLETE CBC AUTOMATED: CPT

## 2025-02-18 PROCEDURE — 3077F SYST BP >= 140 MM HG: CPT | Performed by: SURGERY

## 2025-02-18 ASSESSMENT — PAIN SCALES - GENERAL: PAINLEVEL_OUTOF10: 0-NO PAIN

## 2025-02-19 ENCOUNTER — TELEPHONE (OUTPATIENT)
Dept: SURGERY | Facility: HOSPITAL | Age: 55
End: 2025-02-19
Payer: COMMERCIAL

## 2025-02-19 ENCOUNTER — TELEPHONE (OUTPATIENT)
Dept: SURGICAL ONCOLOGY | Facility: CLINIC | Age: 55
End: 2025-02-19
Payer: COMMERCIAL

## 2025-02-19 DIAGNOSIS — Z17.0 MALIGNANT NEOPLASM OF UPPER-OUTER QUADRANT OF RIGHT BREAST IN FEMALE, ESTROGEN RECEPTOR POSITIVE: ICD-10-CM

## 2025-02-19 DIAGNOSIS — C50.411 MALIGNANT NEOPLASM OF UPPER-OUTER QUADRANT OF RIGHT BREAST IN FEMALE, ESTROGEN RECEPTOR POSITIVE: ICD-10-CM

## 2025-02-19 NOTE — TELEPHONE ENCOUNTER
I called the patient and left a VM. A radiologist contacted me today and there is another area in the right breast that needs to be evaluated. So Makenna needs to have a diagnostic right mamm and US and possible biopsy before we can place magseeds or proceed with surgery. Surgery is on hold until this is sorted out. She will be called to schedule mamm/US/bx.

## 2025-02-20 ENCOUNTER — APPOINTMENT (OUTPATIENT)
Dept: RADIOLOGY | Facility: CLINIC | Age: 55
End: 2025-02-20
Payer: COMMERCIAL

## 2025-03-03 ENCOUNTER — APPOINTMENT (OUTPATIENT)
Dept: PREADMISSION TESTING | Facility: HOSPITAL | Age: 55
End: 2025-03-03
Payer: COMMERCIAL

## 2025-03-05 ENCOUNTER — APPOINTMENT (OUTPATIENT)
Dept: RADIOLOGY | Facility: HOSPITAL | Age: 55
End: 2025-03-05
Payer: COMMERCIAL

## 2025-03-07 ENCOUNTER — HOSPITAL ENCOUNTER (OUTPATIENT)
Dept: RADIOLOGY | Facility: CLINIC | Age: 55
Discharge: HOME | End: 2025-03-07
Payer: COMMERCIAL

## 2025-03-07 DIAGNOSIS — C50.411 MALIGNANT NEOPLASM OF UPPER-OUTER QUADRANT OF RIGHT BREAST IN FEMALE, ESTROGEN RECEPTOR POSITIVE: Primary | ICD-10-CM

## 2025-03-07 DIAGNOSIS — Z17.0 MALIGNANT NEOPLASM OF UPPER-OUTER QUADRANT OF RIGHT BREAST IN FEMALE, ESTROGEN RECEPTOR POSITIVE: ICD-10-CM

## 2025-03-07 DIAGNOSIS — C50.411 MALIGNANT NEOPLASM OF UPPER-OUTER QUADRANT OF RIGHT BREAST IN FEMALE, ESTROGEN RECEPTOR POSITIVE: ICD-10-CM

## 2025-03-07 DIAGNOSIS — C50.919 BREAST CANCER (MULTI): ICD-10-CM

## 2025-03-07 DIAGNOSIS — Z17.0 MALIGNANT NEOPLASM OF UPPER-OUTER QUADRANT OF RIGHT BREAST IN FEMALE, ESTROGEN RECEPTOR POSITIVE: Primary | ICD-10-CM

## 2025-03-07 PROCEDURE — C1739 HC OR 272 NO HCPCS: HCPCS

## 2025-03-07 PROCEDURE — 19081 BX BREAST 1ST LESION STRTCTC: CPT | Mod: RT

## 2025-03-07 PROCEDURE — 2720000007 HC OR 272 NO HCPCS

## 2025-03-07 PROCEDURE — 77061 BREAST TOMOSYNTHESIS UNI: CPT | Mod: RT

## 2025-03-07 PROCEDURE — 2500000004 HC RX 250 GENERAL PHARMACY W/ HCPCS (ALT 636 FOR OP/ED): Performed by: RADIOLOGY

## 2025-03-07 RX ADMIN — Medication 20 ML: at 09:31

## 2025-03-07 ASSESSMENT — PAIN SCALES - GENERAL
PAINLEVEL_OUTOF10: 0 - NO PAIN
PAINLEVEL_OUTOF10: 0 - NO PAIN
PAINLEVEL_OUTOF10: 3

## 2025-03-07 ASSESSMENT — PAIN - FUNCTIONAL ASSESSMENT: PAIN_FUNCTIONAL_ASSESSMENT: 0-10

## 2025-03-07 NOTE — DISCHARGE INSTRUCTIONS
0900: Procedural steps explained and patient given opportunity to verbalize concerns and seek clarification.  Post procedure self-care and potential for bruising , hematoma, and pain reviewed.  Patient verbalizes understanding.      0900: Patient offered aromatherapy, warm blankets and music.   Guided imagery, touch and relaxation breathing to be used throughout the procedure.        1010:    AFTER THE TEST  A steri-strip and bandage will be placed over the incision. You may shower after 24 hours. Remove bandage after 24 hours. Remove bandage after the shower. Leave the steri-strips in place to fall off on their own. If after 1 week the steri-strips are still on, you may remove them. Avoid swimming or soaking in tub for 3 days.     You may have mild discomfort at the test site. If needed, you may take Tylenol (Acetaminophen) for pain. Please avoid taking NSAIDs, Motrin, Advil, Aleve, or ibuprofen for 24 hours following the biopsy. After 24 hours you may resume NSAIDSs.     If you take aspirin, Plavix, Coumadin, Xarelto or Eliquis please tell us. If these medications were stopped by your provider, please ask them when to resume.     You may have some tenderness, bruising or slight bleeding at the site. Please apply ice packs to the site for 15 minutes on and 15 minutes off for a 2 hour minimum.     Most people can return to their usual routine after the procedure. Avoid Strenuous activity for 24 hours.     Sleep in a bra the night after your biopsy. Continue to do so for comfort.     Call your provider if you have any of the following symptoms :  Fever  Increased pain  Increased bleeding  Redness  Increased swelling  Yellowish drainage  Your provider will get the biopsy results within 5 - 7 days. Call your provider with any questions.     Patient education brochure and pain/comfort measures have been reviewed.   Phone number provided to contact [facility] if problems arise.     Patient verbalized understanding of  home going instructions.      1012: Patient discharged ambulatory

## 2025-03-10 ENCOUNTER — TELEPHONE (OUTPATIENT)
Dept: RADIOLOGY | Facility: CLINIC | Age: 55
End: 2025-03-10

## 2025-03-11 ENCOUNTER — TELEPHONE (OUTPATIENT)
Dept: SURGICAL ONCOLOGY | Facility: CLINIC | Age: 55
End: 2025-03-11

## 2025-03-11 ENCOUNTER — APPOINTMENT (OUTPATIENT)
Age: 55
End: 2025-03-11
Payer: COMMERCIAL

## 2025-03-11 LAB
LABORATORY COMMENT REPORT: NORMAL
PATH REPORT.FINAL DX SPEC: NORMAL
PATH REPORT.GROSS SPEC: NORMAL
PATH REPORT.RELEVANT HX SPEC: NORMAL
PATH REPORT.TOTAL CANCER: NORMAL

## 2025-03-13 ENCOUNTER — TELEPHONE (OUTPATIENT)
Dept: SURGICAL ONCOLOGY | Facility: CLINIC | Age: 55
End: 2025-03-13
Payer: COMMERCIAL

## 2025-03-16 DIAGNOSIS — I10 PRIMARY HYPERTENSION: ICD-10-CM

## 2025-03-17 DIAGNOSIS — Z17.0 MALIGNANT NEOPLASM OF UPPER-OUTER QUADRANT OF RIGHT BREAST IN FEMALE, ESTROGEN RECEPTOR POSITIVE: ICD-10-CM

## 2025-03-17 DIAGNOSIS — C50.411 MALIGNANT NEOPLASM OF UPPER-OUTER QUADRANT OF RIGHT BREAST IN FEMALE, ESTROGEN RECEPTOR POSITIVE: ICD-10-CM

## 2025-03-17 RX ORDER — LOSARTAN POTASSIUM 50 MG/1
100 TABLET ORAL DAILY
Qty: 180 TABLET | Refills: 1 | Status: SHIPPED | OUTPATIENT
Start: 2025-03-17

## 2025-03-19 ENCOUNTER — TELEPHONE (OUTPATIENT)
Dept: CARDIAC SURGERY | Facility: HOSPITAL | Age: 55
End: 2025-03-19
Payer: COMMERCIAL

## 2025-03-26 ENCOUNTER — LAB (OUTPATIENT)
Dept: LAB | Facility: HOSPITAL | Age: 55
End: 2025-03-26
Payer: COMMERCIAL

## 2025-03-26 DIAGNOSIS — C50.411 MALIGNANT NEOPLASM OF UPPER-OUTER QUADRANT OF RIGHT FEMALE BREAST: Primary | ICD-10-CM

## 2025-03-26 DIAGNOSIS — Z17.0 ESTROGEN RECEPTOR POSITIVE STATUS (ER+): ICD-10-CM

## 2025-03-26 LAB
ANION GAP SERPL CALCULATED.3IONS-SCNC: 10 MMOL/L (ref 10–20)
BUN SERPL-MCNC: 22 MG/DL (ref 6–23)
CALCIUM SERPL-MCNC: 9.6 MG/DL (ref 8.6–10.3)
CHLORIDE SERPL-SCNC: 107 MMOL/L (ref 98–107)
CO2 SERPL-SCNC: 30 MMOL/L (ref 21–32)
CREAT SERPL-MCNC: 0.8 MG/DL (ref 0.5–1.05)
EGFRCR SERPLBLD CKD-EPI 2021: 88 ML/MIN/1.73M*2
ERYTHROCYTE [DISTWIDTH] IN BLOOD BY AUTOMATED COUNT: 13.1 % (ref 11.5–14.5)
GLUCOSE SERPL-MCNC: 85 MG/DL (ref 74–99)
HCT VFR BLD AUTO: 45.2 % (ref 36–46)
HGB BLD-MCNC: 14.7 G/DL (ref 12–16)
MCH RBC QN AUTO: 30.3 PG (ref 26–34)
MCHC RBC AUTO-ENTMCNC: 32.5 G/DL (ref 32–36)
MCV RBC AUTO: 93 FL (ref 80–100)
NRBC BLD-RTO: 0 /100 WBCS (ref 0–0)
PLATELET # BLD AUTO: 250 X10*3/UL (ref 150–450)
POTASSIUM SERPL-SCNC: 3.9 MMOL/L (ref 3.5–5.3)
RBC # BLD AUTO: 4.85 X10*6/UL (ref 4–5.2)
SODIUM SERPL-SCNC: 143 MMOL/L (ref 136–145)
WBC # BLD AUTO: 6.8 X10*3/UL (ref 4.4–11.3)

## 2025-03-26 PROCEDURE — 85027 COMPLETE CBC AUTOMATED: CPT

## 2025-03-26 PROCEDURE — 80048 BASIC METABOLIC PNL TOTAL CA: CPT

## 2025-04-02 ENCOUNTER — ANESTHESIA EVENT (OUTPATIENT)
Dept: OPERATING ROOM | Facility: HOSPITAL | Age: 55
End: 2025-04-02
Payer: COMMERCIAL

## 2025-04-02 ENCOUNTER — HOSPITAL ENCOUNTER (OUTPATIENT)
Dept: RADIOLOGY | Facility: HOSPITAL | Age: 55
Setting detail: OUTPATIENT SURGERY
Discharge: HOME | End: 2025-04-02
Payer: COMMERCIAL

## 2025-04-02 ENCOUNTER — ANESTHESIA (OUTPATIENT)
Dept: OPERATING ROOM | Facility: HOSPITAL | Age: 55
End: 2025-04-02
Payer: COMMERCIAL

## 2025-04-02 ENCOUNTER — HOSPITAL ENCOUNTER (OUTPATIENT)
Facility: HOSPITAL | Age: 55
Setting detail: OUTPATIENT SURGERY
Discharge: HOME | End: 2025-04-02
Attending: SURGERY | Admitting: SURGERY
Payer: COMMERCIAL

## 2025-04-02 ENCOUNTER — PHARMACY VISIT (OUTPATIENT)
Dept: PHARMACY | Facility: CLINIC | Age: 55
End: 2025-04-02
Payer: MEDICARE

## 2025-04-02 VITALS
DIASTOLIC BLOOD PRESSURE: 88 MMHG | HEART RATE: 69 BPM | BODY MASS INDEX: 31.25 KG/M2 | OXYGEN SATURATION: 95 % | RESPIRATION RATE: 14 BRPM | SYSTOLIC BLOOD PRESSURE: 153 MMHG | TEMPERATURE: 96.8 F | WEIGHT: 197 LBS

## 2025-04-02 DIAGNOSIS — Z17.0 MALIGNANT NEOPLASM OF UPPER-OUTER QUADRANT OF RIGHT BREAST IN FEMALE, ESTROGEN RECEPTOR POSITIVE: Primary | ICD-10-CM

## 2025-04-02 DIAGNOSIS — Z17.0 MALIGNANT NEOPLASM OF UPPER-OUTER QUADRANT OF RIGHT BREAST IN FEMALE, ESTROGEN RECEPTOR POSITIVE: ICD-10-CM

## 2025-04-02 DIAGNOSIS — C50.411 MALIGNANT NEOPLASM OF UPPER-OUTER QUADRANT OF RIGHT BREAST IN FEMALE, ESTROGEN RECEPTOR POSITIVE: Primary | ICD-10-CM

## 2025-04-02 DIAGNOSIS — C50.411 MALIGNANT NEOPLASM OF UPPER-OUTER QUADRANT OF RIGHT BREAST IN FEMALE, ESTROGEN RECEPTOR POSITIVE: ICD-10-CM

## 2025-04-02 DIAGNOSIS — N63.10 BREAST MASS, RIGHT: ICD-10-CM

## 2025-04-02 PROCEDURE — 76098 X-RAY EXAM SURGICAL SPECIMEN: CPT

## 2025-04-02 PROCEDURE — 3600000004 HC OR TIME - INITIAL BASE CHARGE - PROCEDURE LEVEL FOUR: Performed by: SURGERY

## 2025-04-02 PROCEDURE — A4649 SURGICAL SUPPLIES: HCPCS | Performed by: SURGERY

## 2025-04-02 PROCEDURE — 76098 X-RAY EXAM SURGICAL SPECIMEN: CPT | Performed by: RADIOLOGY

## 2025-04-02 PROCEDURE — 76098 X-RAY EXAM SURGICAL SPECIMEN: CPT | Performed by: SURGERY

## 2025-04-02 PROCEDURE — 38792 RA TRACER ID OF SENTINL NODE: CPT

## 2025-04-02 PROCEDURE — 7100000001 HC RECOVERY ROOM TIME - INITIAL BASE CHARGE: Performed by: SURGERY

## 2025-04-02 PROCEDURE — 96372 THER/PROPH/DIAG INJ SC/IM: CPT | Performed by: SURGERY

## 2025-04-02 PROCEDURE — 38792 RA TRACER ID OF SENTINL NODE: CPT | Performed by: SURGERY

## 2025-04-02 PROCEDURE — 88307 TISSUE EXAM BY PATHOLOGIST: CPT | Mod: TC,TRILAB,WESLAB | Performed by: SURGERY

## 2025-04-02 PROCEDURE — 3700000002 HC GENERAL ANESTHESIA TIME - EACH INCREMENTAL 1 MINUTE: Performed by: SURGERY

## 2025-04-02 PROCEDURE — 7100000009 HC PHASE TWO TIME - INITIAL BASE CHARGE: Performed by: SURGERY

## 2025-04-02 PROCEDURE — 3700000001 HC GENERAL ANESTHESIA TIME - INITIAL BASE CHARGE: Performed by: SURGERY

## 2025-04-02 PROCEDURE — 2780000003 HC OR 278 NO HCPCS

## 2025-04-02 PROCEDURE — A38525 PR BX/REMV,LYMPH NODE,DEEP AXILL: Performed by: NURSE ANESTHETIST, CERTIFIED REGISTERED

## 2025-04-02 PROCEDURE — 7100000010 HC PHASE TWO TIME - EACH INCREMENTAL 1 MINUTE: Performed by: SURGERY

## 2025-04-02 PROCEDURE — 2500000004 HC RX 250 GENERAL PHARMACY W/ HCPCS (ALT 636 FOR OP/ED): Performed by: RADIOLOGY

## 2025-04-02 PROCEDURE — 19281 PERQ DEVICE BREAST 1ST IMAG: CPT | Mod: RT

## 2025-04-02 PROCEDURE — 19301 PARTIAL MASTECTOMY: CPT | Performed by: SURGERY

## 2025-04-02 PROCEDURE — A38525 PR BX/REMV,LYMPH NODE,DEEP AXILL: Performed by: ANESTHESIOLOGY

## 2025-04-02 PROCEDURE — 2500000004 HC RX 250 GENERAL PHARMACY W/ HCPCS (ALT 636 FOR OP/ED): Performed by: NURSE ANESTHETIST, CERTIFIED REGISTERED

## 2025-04-02 PROCEDURE — 2720000007 HC OR 272 NO HCPCS

## 2025-04-02 PROCEDURE — 19125 EXCISION BREAST LESION: CPT | Performed by: SURGERY

## 2025-04-02 PROCEDURE — RXMED WILLOW AMBULATORY MEDICATION CHARGE

## 2025-04-02 PROCEDURE — 38900 IO MAP OF SENT LYMPH NODE: CPT | Performed by: SURGERY

## 2025-04-02 PROCEDURE — 3600000009 HC OR TIME - EACH INCREMENTAL 1 MINUTE - PROCEDURE LEVEL FOUR: Performed by: SURGERY

## 2025-04-02 PROCEDURE — 7100000002 HC RECOVERY ROOM TIME - EACH INCREMENTAL 1 MINUTE: Performed by: SURGERY

## 2025-04-02 PROCEDURE — 2500000004 HC RX 250 GENERAL PHARMACY W/ HCPCS (ALT 636 FOR OP/ED): Performed by: SURGERY

## 2025-04-02 PROCEDURE — 19281 PERQ DEVICE BREAST 1ST IMAG: CPT | Mod: RIGHT SIDE | Performed by: RADIOLOGY

## 2025-04-02 PROCEDURE — 3430000001 HC RX 343 DIAGNOSTIC RADIOPHARMACEUTICALS: Performed by: SURGERY

## 2025-04-02 PROCEDURE — 38525 BIOPSY/REMOVAL LYMPH NODES: CPT | Performed by: SURGERY

## 2025-04-02 PROCEDURE — C1739 HC OR 278 NO HCPCS: HCPCS

## 2025-04-02 PROCEDURE — 19282 PERQ DEVICE BREAST EA IMAG: CPT | Mod: RT

## 2025-04-02 PROCEDURE — 2720000007 HC OR 272 NO HCPCS: Performed by: SURGERY

## 2025-04-02 PROCEDURE — 19285 PERQ DEV BREAST 1ST US IMAG: CPT | Mod: RIGHT SIDE | Performed by: RADIOLOGY

## 2025-04-02 PROCEDURE — A9520 TC99 TILMANOCEPT DIAG 0.5MCI: HCPCS | Performed by: SURGERY

## 2025-04-02 PROCEDURE — 19285 PERQ DEV BREAST 1ST US IMAG: CPT | Mod: RT

## 2025-04-02 RX ORDER — ONDANSETRON HYDROCHLORIDE 2 MG/ML
INJECTION, SOLUTION INTRAVENOUS AS NEEDED
Status: DISCONTINUED | OUTPATIENT
Start: 2025-04-02 | End: 2025-04-02

## 2025-04-02 RX ORDER — ISOSULFAN BLUE 50 MG/5ML
INJECTION, SOLUTION SUBCUTANEOUS AS NEEDED
Status: DISCONTINUED | OUTPATIENT
Start: 2025-04-02 | End: 2025-04-02 | Stop reason: HOSPADM

## 2025-04-02 RX ORDER — LIDOCAINE HYDROCHLORIDE 10 MG/ML
0.1 INJECTION, SOLUTION INFILTRATION; PERINEURAL ONCE
Status: DISCONTINUED | OUTPATIENT
Start: 2025-04-02 | End: 2025-04-02 | Stop reason: HOSPADM

## 2025-04-02 RX ORDER — FENTANYL CITRATE 50 UG/ML
INJECTION, SOLUTION INTRAMUSCULAR; INTRAVENOUS AS NEEDED
Status: DISCONTINUED | OUTPATIENT
Start: 2025-04-02 | End: 2025-04-02

## 2025-04-02 RX ORDER — MIDAZOLAM HYDROCHLORIDE 1 MG/ML
1 INJECTION, SOLUTION INTRAMUSCULAR; INTRAVENOUS ONCE AS NEEDED
Status: DISCONTINUED | OUTPATIENT
Start: 2025-04-02 | End: 2025-04-02 | Stop reason: HOSPADM

## 2025-04-02 RX ORDER — SODIUM CHLORIDE, SODIUM LACTATE, POTASSIUM CHLORIDE, CALCIUM CHLORIDE 600; 310; 30; 20 MG/100ML; MG/100ML; MG/100ML; MG/100ML
100 INJECTION, SOLUTION INTRAVENOUS CONTINUOUS
Status: DISCONTINUED | OUTPATIENT
Start: 2025-04-02 | End: 2025-04-02 | Stop reason: HOSPADM

## 2025-04-02 RX ORDER — LIDOCAINE HYDROCHLORIDE 20 MG/ML
INJECTION, SOLUTION INFILTRATION; PERINEURAL AS NEEDED
Status: DISCONTINUED | OUTPATIENT
Start: 2025-04-02 | End: 2025-04-02

## 2025-04-02 RX ORDER — MIDAZOLAM HYDROCHLORIDE 1 MG/ML
INJECTION, SOLUTION INTRAMUSCULAR; INTRAVENOUS AS NEEDED
Status: DISCONTINUED | OUTPATIENT
Start: 2025-04-02 | End: 2025-04-02

## 2025-04-02 RX ORDER — MEPERIDINE HYDROCHLORIDE 25 MG/ML
12.5 INJECTION INTRAMUSCULAR; INTRAVENOUS; SUBCUTANEOUS EVERY 10 MIN PRN
Status: DISCONTINUED | OUTPATIENT
Start: 2025-04-02 | End: 2025-04-02 | Stop reason: HOSPADM

## 2025-04-02 RX ORDER — ONDANSETRON HYDROCHLORIDE 2 MG/ML
4 INJECTION, SOLUTION INTRAVENOUS ONCE AS NEEDED
Status: DISCONTINUED | OUTPATIENT
Start: 2025-04-02 | End: 2025-04-02 | Stop reason: HOSPADM

## 2025-04-02 RX ORDER — FENTANYL CITRATE 50 UG/ML
50 INJECTION, SOLUTION INTRAMUSCULAR; INTRAVENOUS EVERY 5 MIN PRN
Status: DISCONTINUED | OUTPATIENT
Start: 2025-04-02 | End: 2025-04-02 | Stop reason: HOSPADM

## 2025-04-02 RX ORDER — PROPOFOL 10 MG/ML
INJECTION, EMULSION INTRAVENOUS AS NEEDED
Status: DISCONTINUED | OUTPATIENT
Start: 2025-04-02 | End: 2025-04-02

## 2025-04-02 RX ORDER — ALBUTEROL SULFATE 0.83 MG/ML
2.5 SOLUTION RESPIRATORY (INHALATION) ONCE
Status: DISCONTINUED | OUTPATIENT
Start: 2025-04-02 | End: 2025-04-02 | Stop reason: HOSPADM

## 2025-04-02 RX ORDER — TRAMADOL HYDROCHLORIDE 50 MG/1
50 TABLET ORAL EVERY 6 HOURS PRN
Qty: 12 TABLET | Refills: 0 | Status: SHIPPED | OUTPATIENT
Start: 2025-04-02

## 2025-04-02 RX ORDER — LIDOCAINE HYDROCHLORIDE AND EPINEPHRINE 10; 10 UG/ML; MG/ML
INJECTION, SOLUTION INFILTRATION; PERINEURAL AS NEEDED
Status: DISCONTINUED | OUTPATIENT
Start: 2025-04-02 | End: 2025-04-02 | Stop reason: HOSPADM

## 2025-04-02 RX ORDER — BUPIVACAINE HYDROCHLORIDE 5 MG/ML
INJECTION, SOLUTION PERINEURAL AS NEEDED
Status: DISCONTINUED | OUTPATIENT
Start: 2025-04-02 | End: 2025-04-02 | Stop reason: HOSPADM

## 2025-04-02 RX ORDER — HYDROMORPHONE HYDROCHLORIDE 0.2 MG/ML
0.2 INJECTION INTRAMUSCULAR; INTRAVENOUS; SUBCUTANEOUS EVERY 5 MIN PRN
Status: DISCONTINUED | OUTPATIENT
Start: 2025-04-02 | End: 2025-04-02 | Stop reason: HOSPADM

## 2025-04-02 RX ADMIN — ONDANSETRON 4 MG: 2 INJECTION, SOLUTION INTRAMUSCULAR; INTRAVENOUS at 12:11

## 2025-04-02 RX ADMIN — FENTANYL CITRATE 50 MCG: 50 INJECTION, SOLUTION INTRAMUSCULAR; INTRAVENOUS at 12:11

## 2025-04-02 RX ADMIN — MIDAZOLAM 2 MG: 1 INJECTION INTRAMUSCULAR; INTRAVENOUS at 11:59

## 2025-04-02 RX ADMIN — DEXAMETHASONE SODIUM PHOSPHATE 4 MG: 4 INJECTION, SOLUTION INTRAMUSCULAR; INTRAVENOUS at 12:11

## 2025-04-02 RX ADMIN — FENTANYL CITRATE 50 MCG: 50 INJECTION, SOLUTION INTRAMUSCULAR; INTRAVENOUS at 12:03

## 2025-04-02 RX ADMIN — PROPOFOL 200 MG: 10 INJECTION, EMULSION INTRAVENOUS at 12:03

## 2025-04-02 RX ADMIN — Medication 5 ML: at 10:13

## 2025-04-02 RX ADMIN — LIDOCAINE HYDROCHLORIDE 50 MG: 20 INJECTION, SOLUTION INFILTRATION; PERINEURAL at 12:03

## 2025-04-02 RX ADMIN — FENTANYL CITRATE 50 MCG: 50 INJECTION, SOLUTION INTRAMUSCULAR; INTRAVENOUS at 12:40

## 2025-04-02 RX ADMIN — SODIUM CHLORIDE, POTASSIUM CHLORIDE, SODIUM LACTATE AND CALCIUM CHLORIDE: 600; 310; 30; 20 INJECTION, SOLUTION INTRAVENOUS at 11:59

## 2025-04-02 RX ADMIN — Medication 0.5 MILLICURIE: at 12:06

## 2025-04-02 RX ADMIN — FENTANYL CITRATE 50 MCG: 50 INJECTION, SOLUTION INTRAMUSCULAR; INTRAVENOUS at 13:01

## 2025-04-02 ASSESSMENT — PAIN SCALES - GENERAL
PAINLEVEL_OUTOF10: 0 - NO PAIN
PAINLEVEL_OUTOF10: 3
PAINLEVEL_OUTOF10: 0 - NO PAIN
PAINLEVEL_OUTOF10: 3
PAINLEVEL_OUTOF10: 0 - NO PAIN

## 2025-04-02 ASSESSMENT — PAIN - FUNCTIONAL ASSESSMENT
PAIN_FUNCTIONAL_ASSESSMENT: 0-10

## 2025-04-02 ASSESSMENT — COLUMBIA-SUICIDE SEVERITY RATING SCALE - C-SSRS
2. HAVE YOU ACTUALLY HAD ANY THOUGHTS OF KILLING YOURSELF?: NO
1. IN THE PAST MONTH, HAVE YOU WISHED YOU WERE DEAD OR WISHED YOU COULD GO TO SLEEP AND NOT WAKE UP?: NO
6. HAVE YOU EVER DONE ANYTHING, STARTED TO DO ANYTHING, OR PREPARED TO DO ANYTHING TO END YOUR LIFE?: NO

## 2025-04-02 NOTE — ANESTHESIA PREPROCEDURE EVALUATION
Patient: Makenna Reyes    Procedure Information       Date/Time: 04/02/25 1025    Procedures:       Lumpectomy with Bracketed Magseed Localization (Right)      Axillary [place] Lymph Node Biopsy (Right) - Nuclear medicine injection, Lymphazurin, Sentimag probe, neoprobe, radiology for specimen radiograph    [facility]: [facility] / [facility]    Surgeons: Shannan Cohen MD            Relevant Problems   Cardiac   (+) Essential hypertension      GYN   (+) Malignant neoplasm of upper-outer quadrant of right breast in female, estrogen receptor positive       Clinical information reviewed:   Tobacco  Allergies  Meds  Problems  Med Hx  Surg Hx   Fam Hx          NPO Detail:  No data recorded     Physical Exam    Airway  Mallampati: II  TM distance: >3 FB     Cardiovascular    Dental    Pulmonary    Abdominal            Anesthesia Plan    History of general anesthesia?: yes  History of complications of general anesthesia?: no    ASA 3     general     intravenous induction   Anesthetic plan and risks discussed with patient.

## 2025-04-02 NOTE — OP NOTE
Lumpectomy with Bracketed Magseed Localization (R), Axillary [place] Lymph Node Biopsy (R) Operative Note     Date: 2025  OR Location: [facility]    Name: Makenna Reyes, : 1970, Age: 54 y.o., MRN: [id number], Sex: female    Diagnosis  Pre-op Diagnosis      * Malignant neoplasm of upper-outer quadrant of right breast in female, estrogen receptor positive [C50.411, Z17.0] Post-op Diagnosis     * Malignant neoplasm of upper-outer quadrant of right breast in female, estrogen receptor positive [C50.411, Z17.0]     Procedures  Lumpectomy with Bracketed Magseed Localization  08619 - [place] MASTECTOMY PARTIAL    Axillary [place] Lymph Node Biopsy  43355 - [place] BX/EXC LYMPH NODE OPEN DEEP AXILLARY NODE    Excisional right breast biopsy with magseed localization  53231    Surgeons      * Shannan Cohen - Primary    Resident/Fellow/Other Assistant:  Leah Gilman SA    Staff:   Circulator: Morelia  Surgical Assistant: Leah Diego Person: Diana    Anesthesia Staff: Anesthesiologist: Ty Carpenter DO  CRNA: DOLORES Jacobs-DARIUS    Procedure Summary  Anesthesia: General  ASA: III  Estimated Blood Loss: 5mL  Intra-op Medications:   Administrations occurring from 1025 to 1210 on 25:   Medication Name Total Dose   fentaNYL PF 0.05 mg/mL 50 mcg   LR bolus Cannot be calculated   lidocaine (Xylocaine) injection 2 % 50 mg   midazolam (Versed) 1 mg/1 mL 2 mg   propofol (Diprivan) injection 10 mg/mL 200 mg              Anesthesia Record               Intraprocedure I/O Totals       None           Specimen:   ID Type Source Tests Collected by Time   1 : right breast lumpectomy 11 o'clock and right ADH with 2 wires; short stitch=superior, long=lateral Tissue BREAST LUMPECTOMY RIGHT SURGICAL PATHOLOGY EXAM Shannan Cohen MD 2025 1242   2 : right breast tissue atypia with magseed; short stitch=superior, long stitch=lateral Tissue BREAST, EXCISION OF MASS RIGHT SURGICAL PATHOLOGY EXAM Shannan Cohen MD 2025 1255   3 : right  breast superior shave margin; true superior margin marked with clip and red paint Tissue BREAST MARGIN RIGHT SURGICAL PATHOLOGY EXAM Shannan Cohen MD 4/2/2025 1259   4 : right breast lateral shave margin; true lateral margin marked with clip and orange paint Tissue BREAST MARGIN RIGHT SURGICAL PATHOLOGY EXAM Shannan Cohen MD 4/2/2025 1259   5 : right breast anterior shave margin; true anterior margin marked with clip and green paint Tissue BREAST MARGIN RIGHT SURGICAL PATHOLOGY EXAM Shannan Cohen MD 4/2/2025 1259   6 : right breast posterior shave margin; true posterior margin marked with clip and black paint Tissue BREAST MARGIN RIGHT SURGICAL PATHOLOGY EXAM Shannan Cohen MD 4/2/2025 1259   7 : right breast medial shave margin; true medial margin marked with clip and yellow paint Tissue BREAST MARGIN RIGHT SURGICAL PATHOLOGY EXAM Shannan Cohen MD 4/2/2025 1259   8 : right breast inferior shave margin; true inferior margin marked with clip and blue paint Tissue BREAST MARGIN RIGHT SURGICAL PATHOLOGY EXAM Shannan Cohen MD 4/2/2025 1259   9 : right axillary sentinel lymph node #1 target count=2 Tissue SENTINEL LYMPH NODE OTHER SURGICAL PATHOLOGY EXAM Shannan Cohen MD 4/2/2025 1321   10 : right axillary sentinel lymph node #2 target count=4 Tissue SENTINEL LYMPH NODE OTHER SURGICAL PATHOLOGY EXAM Shannan Cohen MD 4/2/2025 1326        Indications: Makenna Reyes is an 54 y.o. female who is having surgery for Malignant neoplasm of upper-outer quadrant of right breast in female, estrogen receptor positive [C50.411, Z17.0].     The patient was seen in the preoperative area. The risks, benefits, complications, treatment options, non-operative alternatives, expected recovery and outcomes were discussed with the patient. The possibilities of reaction to medication, pulmonary aspiration, injury to surrounding structures, bleeding, recurrent infection, the need for additional procedures, failure to diagnose a condition, and creating a  complication requiring transfusion or operation were discussed with the patient. The patient concurred with the proposed plan, giving informed consent.  The site of surgery was properly noted/marked if necessary per policy. The patient has been actively warmed in preoperative area. Preoperative antibiotics are not indicated. Venous thrombosis prophylaxis have been ordered including bilateral sequential compression devices    Procedure Details:   Operative indications: The patient had a mammogram showing masses and calcifications in the right breast.  Biopsy of a mass in the 11 o'clock position, 7 cm from the nipple showed invasive ductal carcinoma.  A biopsy of calcifications showed atypical glandular cells suspicious for invasive lobular carcinoma.  A second area of calcifications in the right breast was biopsied which showed atypical ductal hyperplasia and lobular neoplasia.  Surgical options were reviewed in detail with the patient. The patient chose to proceed with a right lumpectomy and axillary sentinel lymph node biopsy.  We also needed to localize and excise the other 2 areas which showed atypical cells.  After discussion with the radiologist, we plan to do a bracketed needle localization and 1 Magseed localization.  The risks, benefits and procedure were discussed with the patient including deformity of the breast, bleeding, infection, scar tissue formation, decreased function, mobility, or strength of the upper extremity, pain and numbness of the axilla and upper arm, lymphedema and general anesthesia. She understood all risks and agreed to proceed.     Operative report: The patient was taken to the operating room and placed on the table in the supine position. A timeout was performed. The site was marked preoperatively. She received general anesthesia without complication.  Once in the operating room and after anesthesia was obtained, the patient had the injection of technetium 99m tilmanocept lymphoseek  into 2 perireolar areas of the right breast and the injection of 3 cc of Lymphazurin into 4 periareolar areas of the right breast and massaged into the breast for 5 minutes. The sentimag probe was used to identify the Magseed in the breast.  The patient also had 2 needle localization wires placed in the breast prior to surgery.  The patient was prepped and draped in the usual sterile fashion. The lumpectomy was performed.  I plan to excise both wires bracketing the area of the lumpectomy and atypical ductal hyperplasia.  Local anesthesia was obtained and then an incision was made in the upper outer right breast. Dissection continued with Metzenbaum scissors and a Bovie electrocautery.  The superior wire was identified.  It was held into position with a hemostat and brought in through the wound with another hemostat.  A similar procedure was done with the inferior wire where the wire was identified held into position and brought in through the wound with a hemostat.  The inferior most aspect of the dissection was continued until the entire distal portion of the wire was identified.  This tissue was grasped with an Allis clamp.  Next, the superior wire was identified and followed to the reinforced portion.   tissue distal to this was also grasped with an Allis clamp and dissection was then done with a 10 blade scalpel and Bovie electrocautery to excise both of the wires and all of the tissue in between those wires.  The specimen was labeled right lumpectomy 11:00 and right atypical ductal hyperplasia.  The specimen was painted with the vector surgical margin marker kit. The specimen was sent to radiology and a specimen radiograph confirmed that both wires, both tissue marker clips and area of concern were within the tissue specimen. The specimen was then sent to pathology. The posterior aspect of dissection was at pectoralis muscle and pectoralis fascial was taken. The sentimag probe again was used to identify  the Magseed. The area of tissue surrounding the Magseed was grasped with an Allis clamp. The tissue was removed using Metzenbaum scissors and Bovie electrocautery. The posterior aspect of dissection was at pectoralis muscle and pectoralis fascial was taken.  The specimen was labeled with a short stitch superiorly and a long stitch laterally. The sentimag probe was used to confirm that the Magseed was present in the tissue specimen. The cavity was swept with the sentimag probe and no activity was noted. The specimen was labeled as right breast tissue with atypia.  This tissue was medial to the previously excised tissue.  It was painted with the vector surgical margin marker kit. The specimen was sent to radiology and a specimen radiograph confirmed that the Magseed and area of concern were within the tissue specimen.  The clip was not seen however more tissue was taken posteriorly to the level of the pec and therefore it was felt that the clip may have been dislodged.  The specimen was then sent to pathology. Shave margins were then taken from the lumpectomy cavity.  All tissue was excised in a similar fashion with an area of tissue grasped with an Allis clamp and dissection done with Metzenbaum scissors to excise the superior, lateral, inferior, medial, anterior and posterior margins.  Each margin was identified as a shave margin with the new true margin marked with a clip and painted with the appropriate color ink.  Each specimen was sent separately to pathology.  Again, the posterior aspect of dissection was at pectoralis muscle and pectoralis fascial was taken. Hemostasis was achieved with Bovie electrocautery. After adequate hemostasis, the wound was irrigated and the irrigation fluid was suctioned out. Clips were placed to shilpa the lumpectomy cavity near the area of the cancer.  Yunoir was injected into the wound.  A deep layer was reapproximated with interrupted 3-0 Vicryl stitches.  A superficial,  subcutaneous layer was closed with interrupted 3-0 Vicryl stitches. The skin was closed with a running subcuticular 4-0 monocryl stitch.  Next, a right axillary sentinel lymph node biopsy was performed.  Local anesthesia was obtained and then an incision was made with a 15 blade scalpel in the axilla inferior to the axillary hairline. Dissection was continued with the scalpel. The clavicopectoral fascia was incised, gaining entrance into the axilla. The long thoracic nerve and thoracodorsal nerve were identified in order to avoid injury. Blue lymphatics were not identified and no blue stained lymph nodes were seen.  A lymph node which showed activity with the neoprobe was identified. This lymph node was  from the surrounding tissue. Small lymphatics and vessels were ligated with the Ligasure. This lymph node was identified as right axillary sentinel lymph node #1. Target count was 2. It was sent to pathology.  A second lymph node was identified that was not blue stained but had activity with the neoprobe. This lymph node was  from the surrounding tissue. Small lymphatics and vessels were ligated with the Ligasure. This lymph node was identified as right axillary sentinel lymph node #2. Target count was 4. It was sent to pathology. At this point, there were no blue stained lymph nodes, no further activity noted with the neoprobe and no abnormally palpated lymph nodes. After adequate hemostasis, the wound was irrigated and the irrigation fluid was suctioned out.  A subcutaneous layer was closed with interrupted 3-0 Vicryl stitches. The skin was closed with a running, subcuticular 4-0 monocryl stitch. Steri-Strips were placed on both incisions followed by fluffs and a Surgi-Bra. She tolerated the procedure well and transferred to PACU in stable condition.   Complications:  None; patient tolerated the procedure well.    Disposition: PACU - hemodynamically stable.  Condition: stable     [place] Node  Biopsy for Breast Cancer - Right  Operation performed with curative intent Yes   Tracer(s) used to identify sentinel nodes in the upfront surgery (non-neoadjuvant) setting Dye and Radioactive tracer   Tracer(s) used to identify sentinel nodes in the neoadjuvant setting N/A   All nodes (colored or non-colored) present at the end of a dye-filled lymphatic channel were removed Yes   All significantly radioactive nodes were removed Yes   All palpably suspicious nodes were removed Yes   Biopsy-proven positive nodes marked with clips prior to chemotherapy were identified and removed N/A         Task Performed by RNFA or Surgical Assistant:  retracting tissue, helping with visualization for proper surgical exposure of tissues and assistance with hemostasis and closure.    Additional Details: I personally reviewed the specimen radiograph.     Attending Attestation: A qualified resident physician was not available.    Shannan Cohen  Phone Number: [phone]

## 2025-04-02 NOTE — POST-PROCEDURE NOTE
1430- pt brought back from pacu,verbal report received. Pt is alert and awake. Snack and drink given.  being brought back from waiting room.    1455- vss. Dc instructions given and explained to pt and . Both verbally understood. Pt tolerating snack and drink.    1500- pt getting dressed at this time with assistance of .    1510- transport at bedside with wheelchair for dc.

## 2025-04-02 NOTE — H&P
History Of Present Illness  Makenna Reyes is a 54 y.o. female presenting with right breast cancer.     Past Medical History  Past Medical History:   Diagnosis Date    GERD (gastroesophageal reflux disease)     Hypertension        Surgical History  Past Surgical History:   Procedure Laterality Date     SECTION, LOW TRANSVERSE  ,04,06    HYSTERECTOMY  2019        Social History  She reports that she quit smoking about 20 years ago. Her smoking use included cigarettes. She started smoking about 34 years ago. She has a 19.2 pack-year smoking history. She has never used smokeless tobacco. She reports that she does not drink alcohol and does not use drugs.    Family History  Family History   Problem Relation Name Age of Onset    Epilepsy Mother      Other (bladder cancer) Father Chago Patel     Diabetes Father Chago Patel     Pancreatic cancer Father's Sister      Pancreatic cancer Paternal Grandfather      Ovarian cancer Neg Hx      Breast cancer Neg Hx          Allergies  Patient has no known allergies.    Review of Systems   All other systems reviewed and are negative.       Physical Exam  Vitals reviewed.   Constitutional:       Appearance: Normal appearance.   Cardiovascular:      Rate and Rhythm: Normal rate and regular rhythm.   Pulmonary:      Effort: Pulmonary effort is normal.      Breath sounds: Normal breath sounds.   Abdominal:      General: Abdomen is flat.      Palpations: Abdomen is soft.   Musculoskeletal:         General: Normal range of motion.   Neurological:      General: No focal deficit present.      Mental Status: She is alert.   Psychiatric:         Mood and Affect: Mood normal.         Behavior: Behavior normal.         Thought Content: Thought content normal.         Judgment: Judgment normal.          Last Recorded Vitals  Blood pressure (!) 189/115, pulse 74, temperature 35.9 °C (96.6 °F), temperature source Temporal, resp. rate 15, weight 89.4 kg (197 lb), SpO2  98%.    Relevant Results     FINAL DIAGNOSIS   A. 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 See comment and biomarker report.     B. Right breast, stereotactic-guided core needle biopsy:  - Minute focus of atypical glandular cells, suspicious for invasive lobular carcinoma. See comment.      FINAL DIAGNOSIS   A. Right breast calcifications, stereotactic-guided, vacuum-assisted core biopsy at anterior:  -- Atypical ductal hyperplasia.  -- Lobular neoplasia.  -- Columnar cell change.  -- Usual ductal hyperplasia.  -- Microcalcifications associated with atypia, columnar cell change and benign breast ducts and lobules.      Assessment/Plan   Assessment & Plan  Malignant neoplasm of upper-outer quadrant of right breast in female, estrogen receptor positive      Plan: Right Magseed lumpectomy and right needle localization lumpectomy x 2 for masses and calcifications in the right breast and right axillary sentinel lymph node biopsy       I spent 15 minutes in the professional and overall care of this patient.      Shannan Cohen MD

## 2025-04-02 NOTE — ANESTHESIA POSTPROCEDURE EVALUATION
Patient: Makenna Reyes    Procedure Summary       Date: 04/02/25 Room / Location: [facility] 01 / [facility]    Anesthesia Start: 1159 Anesthesia Stop: 1352    Procedures:       Lumpectomy with Bracketed Magseed Localization (Right: Breast)      Axillary [place] Lymph Node Biopsy (Right: Axilla) Diagnosis:       Malignant neoplasm of upper-outer quadrant of right breast in female, estrogen receptor positive      (Malignant neoplasm of upper-outer quadrant of right breast in female, estrogen receptor positive [C50.411, Z17.0])    Surgeons: Shannan Cohen MD Responsible Provider: Ty Carpenter DO    Anesthesia Type: general ASA Status: 3            Anesthesia Type: general    Vitals Value Taken Time   /87 04/02/25 1427   Temp 36 °C (96.8 °F) 04/02/25 1425   Pulse 65 04/02/25 1428   Resp 14 04/02/25 1428   SpO2 92 % 04/02/25 1428   Vitals shown include unfiled device data.    Anesthesia Post Evaluation    Patient location during evaluation: bedside  Patient participation: complete - patient participated  Level of consciousness: awake  Pain management: adequate  Airway patency: patent  Cardiovascular status: acceptable  Respiratory status: acceptable  Hydration status: acceptable  Postoperative Nausea and Vomiting: none        There were no known notable events for this encounter.

## 2025-04-02 NOTE — ANESTHESIA PROCEDURE NOTES
Airway  Date/Time: 4/2/2025 12:05 PM  Urgency: elective    Airway not difficult    Staffing  Performed: CRNA   Authorized by: Ty Carpenter DO    Performed by: DOLORES Jacobs-CRNA  Patient location during procedure: OR    Indications and Patient Condition  Indications for airway management: anesthesia  Spontaneous Ventilation: absent  Sedation level: deep  Preoxygenated: yes  Patient position: sniffing  Mask difficulty assessment: 0 - not attempted  Planned trial extubation    Final Airway Details  Final airway type: supraglottic airway      Successful airway: classic  Size 4     Number of attempts at approach: 1           Preparing Your Skin Before Surgery    There are several simple things you can do at home that can help decrease the risk of surgery related infections.  · Do not shave near the area of your body where you are having surgery for at least 48 hours before surgery.  · Remove all jewelry the night before your surgery.  This includes ring, earrings and body piercings.  Leave jewelry off until after your surgery.  · Remove all makeup, including lipstick and all nail polish (fingers & toes).  · Shower with an antimicrobial soap once a day starting 3 days before surgery and the morning of surgery.  Chlorhexidine based soap such as Hibiclens is recommended.  · Use only fresh laundered towels and bed sheets, put on freshly laundered clothes after showering.  · Do not apply any lotion, powder, perfume, makeup, nail polish, hair gels, hairspray or deodorant after bathing. Instructions for showerin. Wet your body and hair with warm water.  2. Using regular shampoo, wash your hair to remove any oil, gel, mousse, hair spray, etc.  Rinse thoroughly.  3. Wash your face with regular facial cleanser.  Rinse thoroughly.  These steps are done first so that the Chlorhexidine soap isn't washed off by your shampoo or face wash.  4. Use the cleansing product you were told to use.  Examples include:  · Chlorhexidine (Hibiclens) 4 oz bottle - use 2 oz, half the bottle.   · Sponge packets - use directly from the package.  You do not need to add water.  Use the soft side of the sponge.  Use 2 sponges per shower.  5. Using a clean washcloth or sponges, apply the Chlorhexidine to the front of your body from your shoulders down (including under your arms, your groin area and between any skin folds to your thighs).  Avoid contact with your eyes, ears, mouth and genitals.  The soap will not bubble or lather very much, and that is fine.  6. Apply regular soap to the rest of your body. Wait 3 minutes, then rinse your body thoroughly with warm  water.  7. Dry your body thoroughly with a clean, freshly laundered towel.  You may use your hair dry to dry your hair.   8. Put on freshly laundered clothes that are comfortable and loose fitting.  9. Do Not use Chlorhexidine after your procedure.       Chlorhexidine Gluconate 4% solution based soap such as Hibiclens is recommended for pre surgery showers.  · This soap can be picked up free of charge at the [facility] and [facility] (comes as sponges).  · For patients attending the [employer], this soap will be provided to you when you attend class.  · It can be purchased at your local pharmacy including [facility], [facility] and [facility].  Generics are available.    **Patients having surgery with Dr. Hansen may also  the soap free of charge at the [facility], Plastic Surgery Department.

## 2025-04-04 ENCOUNTER — TELEPHONE (OUTPATIENT)
Dept: SURGICAL ONCOLOGY | Facility: CLINIC | Age: 55
End: 2025-04-04
Payer: COMMERCIAL

## 2025-04-04 NOTE — TELEPHONE ENCOUNTER
I called the patient back to answer her questions.  She asked about a numbing sensation on the back of her operative side arm.  I told her that would take some time but it will continue to get better.  She stated her pressure was very high and I told her to call her PCP and get that checked out.  She stated she stopped taking the pain medicine because it was making her feel funny.  I told her that was perfectly ok.  All questions answered.Call ended

## 2025-04-08 ENCOUNTER — OFFICE VISIT (OUTPATIENT)
Dept: PRIMARY CARE | Facility: CLINIC | Age: 55
End: 2025-04-08
Payer: COMMERCIAL

## 2025-04-08 VITALS
RESPIRATION RATE: 20 BRPM | HEART RATE: 76 BPM | TEMPERATURE: 97.7 F | SYSTOLIC BLOOD PRESSURE: 162 MMHG | DIASTOLIC BLOOD PRESSURE: 102 MMHG | BODY MASS INDEX: 29.03 KG/M2 | HEIGHT: 69 IN | OXYGEN SATURATION: 99 % | WEIGHT: 196 LBS

## 2025-04-08 DIAGNOSIS — I10 PRIMARY HYPERTENSION: Primary | ICD-10-CM

## 2025-04-08 PROCEDURE — 3008F BODY MASS INDEX DOCD: CPT | Performed by: FAMILY MEDICINE

## 2025-04-08 PROCEDURE — 3080F DIAST BP >= 90 MM HG: CPT | Performed by: FAMILY MEDICINE

## 2025-04-08 PROCEDURE — 3077F SYST BP >= 140 MM HG: CPT | Performed by: FAMILY MEDICINE

## 2025-04-08 PROCEDURE — 99213 OFFICE O/P EST LOW 20 MIN: CPT | Performed by: FAMILY MEDICINE

## 2025-04-08 RX ORDER — LOSARTAN POTASSIUM AND HYDROCHLOROTHIAZIDE 25; 100 MG/1; MG/1
1 TABLET ORAL DAILY
Qty: 90 TABLET | Refills: 3 | Status: SHIPPED | OUTPATIENT
Start: 2025-04-08 | End: 2026-04-08

## 2025-04-08 RX ORDER — AMLODIPINE BESYLATE 5 MG/1
5 TABLET ORAL DAILY
Qty: 30 TABLET | Refills: 5 | Status: SHIPPED | OUTPATIENT
Start: 2025-04-08 | End: 2025-10-05

## 2025-04-08 ASSESSMENT — PATIENT HEALTH QUESTIONNAIRE - PHQ9
2. FEELING DOWN, DEPRESSED OR HOPELESS: NOT AT ALL
SUM OF ALL RESPONSES TO PHQ9 QUESTIONS 1 AND 2: 0
1. LITTLE INTEREST OR PLEASURE IN DOING THINGS: NOT AT ALL
2. FEELING DOWN, DEPRESSED OR HOPELESS: NOT AT ALL
SUM OF ALL RESPONSES TO PHQ9 QUESTIONS 1 AND 2: 0
1. LITTLE INTEREST OR PLEASURE IN DOING THINGS: NOT AT ALL

## 2025-04-08 ASSESSMENT — ENCOUNTER SYMPTOMS
LOSS OF SENSATION IN FEET: 0
OCCASIONAL FEELINGS OF UNSTEADINESS: 0
DEPRESSION: 0

## 2025-04-08 ASSESSMENT — PAIN SCALES - GENERAL: PAINLEVEL_OUTOF10: 0-NO PAIN

## 2025-04-08 NOTE — PROGRESS NOTES
"Subjective   Patient ID: Makenna Reyes is a 54 y.o. female who presents for Hypertension (PATIENT STATES HER BLOOD PRESSURE HAS BEEN RUNNING HIGH, SHE HAD SURGERY LAST  WEEK FOR BREAST CANCER NAND IT /94).    HPI     Review of Systems   Constitutional: Negative.    HENT: Negative.     Respiratory: Negative.     Cardiovascular: Negative.    Gastrointestinal: Negative.    Genitourinary: Negative.    Musculoskeletal: Negative.    Neurological: Negative.        Objective   BP (!) 162/102 (BP Location: Right arm, Patient Position: Sitting, BP Cuff Size: Adult)   Pulse 76   Temp 36.5 °C (97.7 °F) (Skin)   Resp 20   Ht 1.753 m (5' 9\")   Wt 88.9 kg (196 lb)   SpO2 99%   BMI 28.94 kg/m²     Physical Exam  Constitutional:       General: She is not in acute distress.     Appearance: Normal appearance.   Cardiovascular:      Rate and Rhythm: Normal rate and regular rhythm.      Heart sounds: No murmur heard.  Pulmonary:      Breath sounds: Normal breath sounds. No wheezing.   Neurological:      Mental Status: She is alert.         Assessment/Plan   Problem List Items Addressed This Visit    None  Visit Diagnoses         Codes    Primary hypertension    -  Primary I10    Relevant Medications    losartan-hydrochlorothiazide (Hyzaar) 100-25 mg tablet    amLODIPine (Norvasc) 5 mg tablet             Recheck 4 wks  "

## 2025-04-10 ENCOUNTER — TELEPHONE (OUTPATIENT)
Dept: SURGERY | Facility: HOSPITAL | Age: 55
End: 2025-04-10
Payer: COMMERCIAL

## 2025-04-10 LAB
LAB AP ASR DISCLAIMER: NORMAL
LAB AP BLOCK FOR ADDITIONAL STUDIES: NORMAL
LABORATORY COMMENT REPORT: NORMAL
PATH REPORT.FINAL DX SPEC: NORMAL
PATH REPORT.GROSS SPEC: NORMAL
PATH REPORT.RELEVANT HX SPEC: NORMAL
PATH REPORT.TOTAL CANCER: NORMAL
PATHOLOGY SYNOPTIC REPORT: NORMAL

## 2025-04-10 NOTE — TELEPHONE ENCOUNTER
Result Communication    Resulted Orders   Surgical Pathology Exam   Result Value Ref Range    Case Report       Surgical Pathology                                Case: [id number]                                  Authorizing Provider:  Shannan Cohen MD            Collected:           04/02/2025 1242              Ordering Location:     [facility] Received:            04/02/2025 1413                                     OR                                                                           Pathologist:           Zulma Lyles MD                                                       Specimens:   A) - BREAST LUMPECTOMY RIGHT, Right breast lumpectomy 11 o'clock and right ADH with 2               wires; short stitch=superior, long=lateral                                                          B) - BREAST, EXCISION OF MASS RIGHT, Right breast tissue atypia with magseed; short                 stitch=superior, long stitch=lateral                                                                C) - BREAST MARGIN RIGHT, Right breast superior shave margin; true superior margin                   marked with clip and red paint                                                                      D) - BREAST MARGIN RIGHT, Right breast lateral shave margin; true lateral margin                    marked with clip and orange paint                                                                   E) - BREAST MARGIN RIGHT, Right breast anterior shave margin; true anterior margin                  marked with clip and green paint                                                                    F) - BREAST MARGIN RIGHT, Right breast posterior shave margin; true posterior margin                marked with clip and black paint                                                                    G) - BREAST MARGIN RIGHT, Right breast medial shave margin; true medial margin marked               with clip and yellow  paint                                                                          H) - BREAST MARGIN RIGHT, Right breast inferior shave margin; true inferior margin                   marked with clip and blue paint                                                                     I) - SENTINEL LYMPH NODE BREAST RIGHT, Right axillary sentinel lymph node #1 target                 count=2                                                                                             J) - SENTINEL LYMPH NODE BREAST RIGHT, Right axillary sentinel lymph node #2 target                 count=4                                                                                    FINAL DIAGNOSIS       A.Right breast, wire localized lumpectomy 11:00 and right atypical ductal hyperplasia with 2 wires:   -- Two foci of invasive ductal carcinoma and ductal carcinoma in situ , see synoptic report and see synoptic biomarker summary for smaller mass.  -- Lobular carcinoma in situ, classic type.   -- Previous biopsy site changes x 2.   -- One intramammary  lymph node, negative for carcinoma (0/1), see note.    Note: The largest focus invasive carcinoma measures up to 16 mm and 1 mm respectively. Larger focus of invasive ductal carcinoma is grade 2 and smaller focus (A12) is grade 1.  No extranodal extension is identified. Multiple deeper levels were reviewed in the evaluation of this specimen. Immunohistochemical stain for cytokeratin AE1/3 is negative in lymph node.  E-cadherin immunostain is positive in tumor cells and supports ductal phenotype.  p63 and SMMHC immunostains show loss of myoepithelial layer in the smaller focus of invasive carcinoma and supports above diagnosis.     B. Right breast with atypia, mag seed localized lumpectomy:   -- Invasive carcinoma with ductal and lobular features, grade 1 and ductal carcinoma in situ, see synoptic report. See synoptic biomarker summary.   -- Lobular carcinoma in situ, classic type.   --  Previous biopsy site changes.     Note: Based on microscopic examination and three dimensional reconstruction, largest focus invasive carcinoma measures up to measures, 8 mm.  E-cadherin immunostain shows variable immunostaining in tumor cells and supports ductal and lobular phenotype.     C. Right breast, superior margin, excision:  -- Negative for carcinoma.      D. Right breast, lateral margin, excision:  -- Negative for carcinoma.     E. Right breast, anterior margin, excision:  -- Negative for carcinoma.     F. Right breast, posterior margin, excision:  -- Negative for carcinoma.     G. Right breast, medial margin, excision:  -- Negative for carcinoma.     H. Right breast, inferior margin, excision:  -- Lobular carcinoma in situ, classic type, see note.   -- Negative for invasive carcinoma and ductal carcinoma in situ.     Note: E-cadherin immunostain show absent/reduced expression in the focus of lobular carcinoma in situ and supports above diagnosis.     I. Right breast, sentinel lymph node # 1, excision:    -- One lymph node, negative for carcinoma (0/1), see note.    Note: Multiple deeper levels were reviewed in the evaluation of this specimen. Immunohistochemical stain for cytokeratin AE1/3 is negative.       J. Right breast, sentinel lymph node # 2, excision:    -- One lymph node, negative for carcinoma (0/1), see note.    Note: Multiple deeper levels were reviewed in the evaluation of this specimen. Immunohistochemical stain for cytokeratin AE1/3 is negative.       : Dr Lenka Perkins      Slides are reviewed at breast review conference via  Zoom meeting.                   By the signature on this report, the individual or group listed as making the Final Interpretation/Diagnosis certifies that they have reviewed this case.       Case Summary Report       INVASIVE CARCINOMA OF THE BREAST: Resection   INVASIVE CARCINOMA OF THE BREAST: RESECTION - All Specimens   8th Edition - Protocol  posted: 6/19/2024      SPECIMEN      Procedure:    Excision (less than total mastectomy)       Specimen Laterality:    Right       TUMOR      Tumor Site:    Clock position       :    11 o'clock     Histologic Type:    Invasive carcinoma of no special type (ductal)       Histologic Type Comment:    Two foci of invasive ductal carcinoma in part A and one focus of invasive carcinoma with ductal and lobular features in part B     Histologic Grade (Conrad Histologic Score):          Glandular (Acinar) / Tubular Differentiation:    Score 3       Nuclear Pleomorphism:    Score 2       Mitotic Rate:    Score 1       Overall Grade:    Grade 2 (scores of 6 or 7)     Tumor Size:    Greatest dimension of largest invasive focus (Millimeters): Based on microscopic examination and three dimensional reconstruction measures,  16 mm    Tumor Focality:    Multiple foci of invasive carcinoma       Number of Foci:    3       Sizes of Individual Foci in Millimeters (mm):    16 mm; 8 mm; 1 mm     Ductal Carcinoma In Situ (DCIS):    Present       :    Negative for extensive intraductal component (EIC)       Architectural Patterns:    Cribriform       Architectural Patterns:    Solid       Nuclear Grade:    Grade II (intermediate)       Necrosis:    Present, focal (small foci or single cell necrosis)     Lymphatic and / or Vascular Invasion:    Not identified     Dermal Lymphatic and / or Vascular Invasion:    Not identified     Microcalcifications:    Present in DCIS     Microcalcifications:    Present in invasive carcinoma     Microcalcifications:    Present in non-neoplastic tissue     Treatment Effect in the Breast:    No known presurgical therapy       MARGINS    Margin Status for Invasive Carcinoma:    All margins negative for invasive carcinoma       Distance from Invasive Carcinoma to Closest Margin:    Greater than: 2 mm    Margin Status for DCIS:    All margins negative for DCIS       Distance from DCIS to Closest Margin:     Greater than: 2 mm    Margin Comment:    Margins assessment includes the additional final margins for both lumpectomy (specimens C through H).       REGIONAL LYMPH NODES    Regional Lymph Node Status:          :    All regional lymph nodes negative for tumor       Total Number of Lymph Nodes Examined (sentinel and non-sentinel):    3       Number of [place] Nodes Examined:    2       pTNM CLASSIFICATION (AJCC 8th Edition)      Reporting of pT, pN, and (when applicable) pM categories is based on information available to the pathologist at the time the report is issued. As per the AJCC (Chapter 1, 8th Ed.) it is the managing physician's responsibility to establish the final pathologic stage based upon all pertinent information, including but potentially not limited to this pathology report.    pT Category:    pT1c     T Suffix:    (m)     pN Category:    pN0     N Suffix:    (sn)       ADDITIONAL FINDINGS    Additional Findings:    Atypical ductal hyperplasia, columnar cell changes, apocrine cysts, fibroadenomatous changes and ususal ductal hyperplasia       SPECIAL STUDIES      Estrogen Receptor (ER) Status:    Positive (greater than 10% of cells demonstrate nuclear positivity)         Percentage of Cells with Nuclear Positivity:    > 95 %      Progesterone Receptor (PgR) Status:    Positive         Percentage of Cells with Nuclear Positivity:    5 %      HER2 (by immunohistochemistry):    Negative (Score 1+)       Testing Performed on Case Number:    [id number] A1. See synoptic biomarker summary for other two masses.    Breast Biomarker Reporting Template   BREAST BIOMARKER REPORTING TEMPLATE - A   Protocol posted: 12/13/2023         Test(s) Performed:            Estrogen Receptor (ER) Status:    Positive (greater than 10% of cells demonstrate nuclear positivity)           Percentage of Cells with Nuclear Positivity:    81-90%           Average Intensity of Staining:    Moderate         Test Type:    Food and Drug  Administration (FDA) cleared (test / vendor): Roche CONFIRM anti-(ER) (SP1) Rabbit Monoclonal Primary Antibody, Roche Multimer Detection         Primary Antibody:    SP1         Scoring System:    No separate scoring system used       Test(s) Performed:            Progesterone Receptor (PgR) Status:    Positive           Percentage of Cells with Nuclear Positivity:    61-70%           Average Intensity of Staining:    Moderate         Test Type:    Food and Drug Administration (FDA) cleared (test / vendor): Roche CONFIRM anti-(ID) (1E2) Rabbit Monoclonal Primary Anitbody, Roche Multimer Detection         Primary Antibody:    1E2         Scoring System:    No separate scoring system used       Test(s) Performed:            HER2 by Immunohistochemistry:    Negative (Score 0)         Test Type:    Food and Drug Administration (FDA) cleared (test / vendor): Roche Pathway anti-HER-2/jose (4B5) Rabbit Monoclonal Primary Antibody, Roche Multimer Detection         Primary Antibody:    4B5       Cold Ischemia and Fixation Times:    Meet requirements specified in latest version of the ASCO / CAP Guidelines       Testing Performed on Block Number(s):    A12       METHODS      Fixative:    Formalin       Image Analysis:    Not performed          Comment(s):    The biomarkers are reported on smaller focus of invasive ductal carcinoma, grade 1, measuring 1 mm.    Breast Biomarker Reporting Template   BREAST BIOMARKER REPORTING TEMPLATE - B   Protocol posted: 12/13/2023         Test(s) Performed:            Estrogen Receptor (ER) Status:    Positive (greater than 10% of cells demonstrate nuclear positivity)           Percentage of Cells with Nuclear Positivity:    %           Average Intensity of Staining:    Strong         Test Type:    Food and Drug Administration (FDA) cleared (test / vendor): Roche CONFIRM anti-(ER) (SP1) Rabbit Monoclonal Primary Antibody, Roche Multimer Detection         Primary Antibody:    SP1          "Scoring System:    No separate scoring system used       Test(s) Performed:            Progesterone Receptor (PgR) Status:    Positive           Percentage of Cells with Nuclear Positivity:    %           Average Intensity of Staining:    Strong         Test Type:    Food and Drug Administration (FDA) cleared (test / vendor): Roche CONFIRM anti-(OR) (1E2) Rabbit Monoclonal Primary Anitbody, Roche Multimer Detection         Primary Antibody:    1E2         Scoring System:    No separate scoring system used       Test(s) Performed:            HER2 by Immunohistochemistry:    Negative (Score 1+)         Test Type:    Food and Drug Administration (FDA) cleared (test / vendor): Roche Pathway anti-HER-2/jose (4B5) Rabbit Monoclonal Primary Antibody, Roche Multimer Detection         Primary Antibody:    4B5       Cold Ischemia and Fixation Times:    Meet requirements specified in latest version of the ASCO / CAP Guidelines       Testing Performed on Block Number(s):    B3       METHODS      Fixative:    Formalin       Image Analysis:    Not performed       Block for Additional Biomarkers/Molecular Studies       Normal Block: A1  Tumor Block: A3 (largest mass), A12 (smallest mass), B3 ( second largest mass)      Clinical History       Pre-op diagnosis:  Malignant neoplasm of upper-outer quadrant of right breast in female, estrogen receptor positive [C50.411, Z17.0]      Gross Description       A: Received fresh in a [employer]in device, labeled with the patient´s name and hospital number and \"right breast lumpectomy 11:00 and right ADH with 2 wires\", is an irregular segment of yellow lobulated fibrofatty tissue, 6.0 (medial-lateral) x 5.8 (superior-inferior) x 2.3 (anterior-posterior) cm.  A skin ellipse is not present.  The accompanying radiograph demonstrates two wires with associated biopsy clips, a mass, and a span of calcifications The specimen is oriented with a short superior and a long lateral stitch.  A localizing " needle is not identified.  The specimen is inked in the following manner:  anterior green, posterior black, superior red, inferior blue, medial yellow, and lateral orange. The specimen is serially sectioned from superior to inferior into 14 slices maintaining the bracketed area of concern.  The cut surface reveals a mass and an area of lesional fibrous tissue, each with a separate biopsy site.  The mass is ill-defined, firm, and gray-white measuring 1.6 x 1.2 x 1.1 cm. The mass is located in slices 4-6, 0.1 cm from the anterior margin, 0.3 cm from the posterior margin, 1.0 cm from the superior margin, 3.6 cm from the inferior margin, 2.5 cm from the lateral margin, and 2.6 cm from the medial margin.  A hemorrhagic biopsy cavity with open coil clip is identified measuring 0.8 x 0.6 x 0.5 cm and is located 0.2 cm from the anterior margin.  The lesional fibrous tissue shows hemorrhage, fibrocystic nodularity, and scant calcifications and involves an area measuring approximately 1.8 x 1.5 x 1.2 cm.  There is a second hemorrhagic biopsy cavity containing an hourglass clip measuring 0.6 x 0.5 x 0.4 cm and is located 0.2 cm from the anterior and inferior margins.  The 2 biopsy sites measure 3.2 cm apart and the entire bracket area involves slices 4-13 measuring approximately 4.2 x 3.8 x 1.7 cm.  The remainder of the breast parenchyma contains 30% dense fibrous tissue overall with central hemorrhagic discoloration and friable areas about the anterior and inferior aspects.  No other lesions are identified.  A photograph has been taken.  Representative sections are submitted in 21 cassettes.  [facility]    NOTE:  Ischemia time: 12:42 on 04/02/2025.  This specimen was placed into formalin at: 13:18 on 04/02/2025.     Summary of Cassettes:  Specimen Label Site  A  1 Slice 1, superior end (representative perpendicular)  2 Slice 3, uninvolved superior to mass and bracketed area  3 Slice 4, mass and bracketed area at most superior  "aspect  4-6 Slice 5, mass with bracket area (entire slice trisected)  7 Slice 6, remaining mass with biopsy site including bracketed area  8-9 Slice 7, uninvolved directly inferior to mass and bracketed area between biopsy sites   10-11 Slice 8, bracketed area between biopsy sites (entire slice bisected)  12 Slice 9, bracketed area between biopsy sites directly superior to lesional fibrous tissue (anterior half of slice)  13 Slice 10, lesional fibrous tissue at most superior with bracket area   14-15 Slice 11, lesional fibrous tissue with bracket area (entire slice bisected)  16-17 Slice 12, lesional fibrous tissue with biopsy site bracketed area (entire slice bisected)  18-19 Slice 13, remaining lesional fibrous tissue and biopsy site bracketed area (entire slice bisected)  20-21 Slice 14, inferior end (entire perpendicular)  B: Received fresh in a Dubin device, labeled with the patient´s name and hospital number and \"right breast tissue atypia with Mag seed\", is an irregular segment of yellow lobulated fibrofatty tissue, 4.7 (medial-lateral) x 3.5 (superior-inferior) x 1.5 (anterior-posterior) cm.  A skin ellipse is not present.  The accompanying radiograph demonstrates a Mag seed in the specimen. The specimen is oriented with a short superior and a long lateral stitch.  A localizing needle is not identified.  The specimen is inked in the following manner:  anterior green, posterior black, superior red, inferior blue, medial yellow, and lateral orange. The specimen is serially sectioned from medial to lateral into 12 slices.  The cut surface reveals a hemorrhagic biopsy cavity containing synthetic gel and measuring 0.9 x 0.9 x 0.5 cm.  The biopsy cavity is located in slices 4-6, 0.5 cm from the anterior margin, 0.3 cm from the posterior margin, 2.6 cm from the superior margin, less than 0.1 cm from the inferior margin, 2.2 cm from the lateral margin, and 0.9 cm from the medial margin.  The biopsy cavity is " "surrounded by dense fibrous tissue with hemorrhagic discoloration and fibrocystic nodularity.  The surrounding dense fibrous tissue spans slice 1-6 and measures approximately 1.8 x 1.6 x 1.3 cm.  The remainder of the breast parenchyma is yellow and fatty with less than 10% dense fibrous tissue.  No other lesions are identified.  A photograph has been taken.  The specimen is entirely submitted in 15 cassettes.  [facility]     NOTE:  Ischemia time: 12:55 on 04/02/2025.  This specimen was placed into formalin at: 13:18 on 04/02/2025.     Summary of Cassettes:  Specimen Label Site  B  1 Slice 1, medial end (entire perpendicular)  2 Slice 2, dense fibrous tissue with fibrocystic nodularity (entire slice)  3 Slice 3, dense fibrous tissue with fibrocystic nodularity (entire slice)  4-5 Slice 4, biopsy cavity and dense fibrous tissue with fibrocystic nodularity (entire slice bisected)  6-7 Slice 5, biopsy cavity and dense fibrous tissue with fibrocystic nodularity (entire slice bisected)  8-9 Slice 6, remaining biopsy cavity and dense fibrous tissue with fibrocystic nodularity (entire slice bisected)  10 Slice 7, uninvolved directly lateral to biopsy cavity (entire slice)  11 Slice 8, entire slice  12 Slice 9, entire slice  13 Slice 10, entire slice  14 Slice 11, entire slice  15 Slice 12, lateral end (entire perpendicular)  C: Received fresh, labeled with the patient's name and hospital number and \"right breast superior shave margin\", is an irregular segment of yellow lobulated fibrofatty tissue, 2.4 x 2.0 x 1.1 cm.  The specimen is oriented with a clip on the new margin.  The new margin is inked red. The specimen is serially sectioned to reveal lobulated, yellow, and fatty cut surfaces with 10% dense fibrous tissue. No lesion is identified. The specimen is entirely submitted in 3 cassettes.  [facility]     NOTE:  Ischemia time: 15:59 on 04/02/2025.  This specimen was placed into formalin at: 14:10 on 04/02/2025.  D: Received fresh, " "labeled with the patient's name and hospital number and \"right breast lateral shave margin\", is an irregular segment of yellow lobulated fibrofatty tissue, 3.1 x 1.5 x 1.4 cm.  The specimen is oriented with a clip on the new margin.  The new margin is inked orange. The specimen is serially sectioned to reveal yellow fatty cut surfaces with 20% dense fibrous tissue. No lesion is identified. The specimen is entirely submitted in 3 cassettes.  [facility]     NOTE:  Ischemia time: 15:59 on 04/02/2025.  This specimen was placed into formalin at: 14:10 on 04/02/2025.  E: Received fresh, labeled with the patient's name and hospital number and \"right breast anterior shave margin\", is an irregular segment of yellow lobulated fibrofatty tissue, 2.8 x 1.7 x 1.2 cm.  The specimen is oriented with a clip on the new margin.  The new margin is inked green. The specimen is serially sectioned to reveal lobulated, yellow, and fatty cut surfaces with less than 10% fibrous tissue. No lesion is identified. The specimen is entirely submitted in 3 cassettes.  [facility]     NOTE:  Ischemia time: 15:59 on 04/02/2025.  This specimen was placed into formalin at: 14:10 on 04/02/2025.  F: Received fresh, labeled with the patient's name and hospital number and \"right breast posterior shave margin\", is an irregular segment of yellow lobulated fibrofatty tissue, 2.8 x 1.8 x 1.2 cm.  The specimen is oriented with a clip on the new margin.  The new margin is inked black. The specimen is serially sectioned to reveal lobulated, yellow, and fatty cut surfaces with less than 10% dense fibrous tissue. No lesion is identified. The specimen is entirely submitted in 3 cassettes.  [facility]     NOTE:  Ischemia time: 15:59 on 04/02/2025.  This specimen was placed into formalin at: 14:10 on 04/02/2025.  G: Received fresh, labeled with the patient's name and hospital number and \"right breast medial shave margin\", is an irregular segment of yellow lobulated fibrofatty tissue, 1.5 " "x 1.0 x 0.8 cm.  The specimen is oriented with a clip on the new margin.  The new margin is inked yellow. The specimen is serially sectioned to reveal lobulated, yellow, and fatty cut surfaces. No lesion is identified. The specimen is entirely submitted in 1 cassettes.  [facility]     NOTE:  Ischemia time: 15:59 on 04/02/2025.  This specimen was placed into formalin at: 14:10 on 04/02/2025.  H: Received fresh, labeled with the patient's name and hospital number and \"right breast inferior shave margin\", is an irregular segment of yellow lobulated fibrofatty tissue, 2.8 x 2.0 x 1.5 cm.  The specimen is oriented with a clip on the new margin.  The new margin is inked blue. The specimen is serially sectioned to reveal lobulated, yellow, and fatty cut surfaces. No lesion is identified. The specimen is entirely submitted in 3 cassettes.  [facility]     NOTE:  Ischemia time: 15:59 on 04/02/2025.  This specimen was placed into formalin at: 14:10 on 04/02/2025.  I: Received fresh, labeled with the patient's name and hospital number and \"right axillary sentinel lymph node #1\", is a possible lymph node with attached adipose tissue measuring 1.8 x 1.5 x 1.0 cm.  The lymph node is serially sectioned.  The specimen is entirely submitted in 1 cassette.   [facility]  J: Received fresh, labeled with the patient's name and hospital number and \"right axillary sentinel lymph node #2\", is a possible lymph node with attached adipose tissue measuring 1.5 x 1.2 x 1.1 cm.  The lymph node is serially sectioned.  The specimen is entirely submitted in 1 cassette.  [facility]    Gross dissection performed at:  [facility]  [id number] [place] [address]  Phone: [phone]  Fax: [phone]       Disclaimer       One or more of the reagents used to perform assays on this specimen MAY have contained components considered to be analyte specific reagents (ASR's).  ASR's have not been cleared or approved by the U.S. Food " and Drug Administration.  These assays were developed and their performance characteristics determined by the Department of Pathology at [facility]. The FDA does not require this test to go through premarket FDA review. This test is used for clinical purposes. It should not be regarded as investigational or for research. This laboratory is certified under the Clinical Laboratory Improvement Amendments (CLIA) as qualified to perform high complexity clinical laboratory testing.  The assays were performed with appropriate positive and negative controls which stained appropriately.         1:24 PM      The patient was called with the pathology results.  A message was left on voicemail.  We will discuss the results at her postoperative visit.  She is to call if she has questions prior to that.

## 2025-04-11 ENCOUNTER — APPOINTMENT (OUTPATIENT)
Dept: PRIMARY CARE | Facility: CLINIC | Age: 55
End: 2025-04-11
Payer: COMMERCIAL

## 2025-04-11 DIAGNOSIS — Z17.0 MALIGNANT NEOPLASM OF UPPER-OUTER QUADRANT OF RIGHT BREAST IN FEMALE, ESTROGEN RECEPTOR POSITIVE: ICD-10-CM

## 2025-04-11 DIAGNOSIS — C50.411 MALIGNANT NEOPLASM OF UPPER-OUTER QUADRANT OF RIGHT BREAST IN FEMALE, ESTROGEN RECEPTOR POSITIVE: ICD-10-CM

## 2025-04-13 ASSESSMENT — ENCOUNTER SYMPTOMS
GASTROINTESTINAL NEGATIVE: 1
NEUROLOGICAL NEGATIVE: 1
MUSCULOSKELETAL NEGATIVE: 1
CARDIOVASCULAR NEGATIVE: 1
RESPIRATORY NEGATIVE: 1
CONSTITUTIONAL NEGATIVE: 1

## 2025-04-14 NOTE — PROGRESS NOTES
Subjective   Makenna Reyes is a 54 y.o. female here for a postoperative visit.  She had a she had 3 right lumpectomies and a sentinel lymph node biopsy on 4/2/2025.  She is doing well overall.  She is having some burning pain along the underside of her arm.    Findings at that time were the following:   Invasive ductal carcinoma, grade 2 with DCIS; invasive ductal carcinoma, grade 1 with DCIS; invasive carcinoma with ductal and lobular features, grade 1 with DCIS  Tumor size: 1.6 cm, 0.1 cm, 0.8 cm ; all margins >2 mm for invasive cancer and DCIS   Tumor grade: 2, 1, 1  Estrogen Receptor: >95%, 81-90%, , %  Progesterone Receptor: 5%, 61-70%, %  Her-2 jose: Her 2 neg (1+),  Her 2 neg (1+), Her 2 neg (1+)  Lymph node status: 0/2      Cancer Staging   Malignant neoplasm of upper-outer quadrant of right breast in female, estrogen receptor positive  Staging form: Breast, AJCC 8th Edition  - Clinical stage from 1/28/2025: Stage IA - Signed by Shannan Cohen MD on 2/14/2025  cT1  cN0  cM0  [place] grade: G2  ER Status: Positive  UT Status: Positive  HER2 Status: Negative  - Pathologic stage from 4/2/2025: Stage IA - Signed by Shannan Cohen MD on 4/14/2025  pT1c  pN0  cM0  [place] grade: G2  ER Status: Positive  UT Status: Positive  HER2 Status: Negative        Objective     Physical Exam  Chest:          Comments: Both incisions are healing well with no evidence of infection or hematoma.  Small to moderate seroma in breast         Alert and oriented.      Assessment/Plan   Stage IA multi centric right breast cancer diagnosed 1/28/2025.   [id number]   Invasive ductal carcinoma, grade 2 with DCIS; ER >95%, UT 5%, HER2 negative; 1.6 cm  invasive ductal carcinoma, grade 1 with DCIS; ER 81-90%, UT 61-70%, HER2 negative; 0.1 cm  invasive carcinoma with ductal and lobular features, grade 1 with DCIS, ER %, UT %, HER2 negative; 0.8 cm    She is doing well following surgery.  She may resume all activities  without restrictions.  The tightness and burning pain under the right arm is normal after having this type of surgery.  Exercises have been reviewed to help with range of motion.  If you are having a problem with range of motion or any burning still after 4 to 6 weeks, please contact me and we can refer you to physical therapy.    A copy of the pathology report was given to the patient and reviewed in detail with the patient.     Recommendations from interdisciplinary breast tumor conference were reviewed with the patient.  Oncotype has been sent.    You will need to schedule an appointment with a medical oncologist to discuss hormonal therapy (estrogen blocking pill) and possible additional treatment.  You will need to schedule an appointment with a radiation oncologist for radiation therapy.  To schedule an appointment with genetics for possible genetic testing.    Follow-up with me in December 2025 with a bilateral mammogram      Shannan Cohen MD

## 2025-04-17 ENCOUNTER — APPOINTMENT (OUTPATIENT)
Dept: HEMATOLOGY/ONCOLOGY | Facility: HOSPITAL | Age: 55
End: 2025-04-17
Payer: COMMERCIAL

## 2025-04-17 NOTE — TUMOR BOARD NOTE
MULTIDISCIPLINARY BREAST CANCER TUMOR BOARD CONFERENCE NOTE  Makenna Reyes was presented at Breast Cancer Tumor Board Conference  Conference date: 4/17/2025  Presenting Provider(s): Dr. Shannan Cohen  Present at Conference: Medical Oncology, Radiation Oncology, Surgical Oncology, Radiology, and Pathology Representatives  Conference Review Type: Pathology Review    National Guidelines discussed: Yes    Surgical Resection: Surgery is complete.  Radiation therapy: indicated   Genomic Testing: yes OncotypeDx   Systemic therapy: Consider chemotherapy based on OncotypeDx followed by endocrine therapy  Clinical Trial Eligible: no   Genetics: meets NCCN criteria, referred    Referral Recommendations:  Genetics    Cancer Staging:  Cancer Staging   Malignant neoplasm of upper-outer quadrant of right breast in female, estrogen receptor positive  Staging form: Breast, AJCC 8th Edition  - Clinical stage from 1/28/2025: Stage IA (cT1, cN0, cM0, G2, ER+, IL+, HER2-) - Signed by Shannan Cohen MD on 2/14/2025  - Pathologic stage from 4/2/2025: Stage IA (pT1c(3), pN0(sn), cM0, G2, ER+, IL+, HER2-) - Signed by Shannan Cohen MD on 4/14/2025       Disclaimer  SCC tumor board recommendations represent the consensus opinion of physicians present at a weekly patient care conference. The treating [facility] physician is not always present, and many of the physicians formulating the recommendation have not personally seen or examined the patient under discussion. It is understood that the treating [facility] physician considers the expertise of the Tumor Board Recommendation in formulating his/her plan for the patient. However, in many situations, based on individualized patient considerations, a different plan is determined by the treating physician to be the optimal medical management.    Scribe Attestation  By signing my name below, Jennifer DIXON Scribe   attest that this documentation has been prepared under the direction and in the presence of BREAST  TUMOR BOARD.

## 2025-04-22 ENCOUNTER — OFFICE VISIT (OUTPATIENT)
Dept: SURGICAL ONCOLOGY | Facility: CLINIC | Age: 55
End: 2025-04-22
Payer: COMMERCIAL

## 2025-04-22 VITALS
WEIGHT: 199.1 LBS | BODY MASS INDEX: 30.17 KG/M2 | DIASTOLIC BLOOD PRESSURE: 79 MMHG | RESPIRATION RATE: 17 BRPM | HEART RATE: 81 BPM | HEIGHT: 68 IN | TEMPERATURE: 97.7 F | SYSTOLIC BLOOD PRESSURE: 128 MMHG

## 2025-04-22 DIAGNOSIS — C50.411 MALIGNANT NEOPLASM OF UPPER-OUTER QUADRANT OF RIGHT BREAST IN FEMALE, ESTROGEN RECEPTOR POSITIVE: Primary | ICD-10-CM

## 2025-04-22 DIAGNOSIS — Z17.0 MALIGNANT NEOPLASM OF UPPER-OUTER QUADRANT OF RIGHT BREAST IN FEMALE, ESTROGEN RECEPTOR POSITIVE: Primary | ICD-10-CM

## 2025-04-22 PROCEDURE — 3074F SYST BP LT 130 MM HG: CPT | Performed by: SURGERY

## 2025-04-22 PROCEDURE — 99211 OFF/OP EST MAY X REQ PHY/QHP: CPT | Performed by: SURGERY

## 2025-04-22 PROCEDURE — 3078F DIAST BP <80 MM HG: CPT | Performed by: SURGERY

## 2025-04-22 PROCEDURE — 3008F BODY MASS INDEX DOCD: CPT | Performed by: SURGERY

## 2025-04-22 ASSESSMENT — PAIN SCALES - GENERAL: PAINLEVEL_OUTOF10: 4

## 2025-04-22 NOTE — PATIENT INSTRUCTIONS
Follow-up with Dr. Cohen in December 2025 with a bilateral mammogram.  Referrals will be placed for medical oncology, radiation oncology and genetics

## 2025-04-25 NOTE — PROGRESS NOTES
Radiation Oncology Outpatient Consult    Patient Name:  Makenna Reyes  MRN:  [id number]  :  1970    Referring Provider: Shannan Cohen MD  Primary Care Provider: Alphonse Chapman DO  Care Team: Patient Care Team:  Alphonse Chapman DO as PCP - General (Family Medicine)  Trixie Smith DO as Radiation Oncologist (Radiation Oncology)  Veronique Conroy, RN as Registered Nurse (Radiation Oncology)    Date of Service: 2025     Diagnosis: Infiltrating duct and lobular carcinoma of right breast, Clinical: Stage IA (cT1, cN0, cM0, G2, ER+, OH+, HER2-)  Infiltrating duct and lobular carcinoma of right breast, Pathologic: Stage IA (pT1c(3), pN0(sn), cM0, G2, ER+, OH+, HER2-)    Previous Treatments:  2025: right breast lumpectomy and sentinel node biopsy    History of Present Illness:  Ms. Reyes is a 54 y.o. woman who had a routine screening mammogram on 2024 that showed microcalcifications in the upper outer quadrant of the right breast. Targeted ultrasound and diagnostic mammogram on 2025 showed a 8.9 x 7.6 x 6.4 mm nodule in the right breast at the 11 o'clock position, 7 cm from the nipple. There were grouped microcalcifications redemonstrated in the right breast, which occupied an area of 2 x 3 cm. Ultrasound-guided biopsy of the right breast mass was performed on 2025, with pathology positive for invasive ductal carcinoma, grade 1-2, ER/OH positive, HER2/jose negative.  Additional biopsy of the right breast showed a minute focus of atypical glandular cells, suspicious for invasive lobular carcinoma . There was concern for an additional area on ultrasound, so the patient returned for stereotactic biopsy of additional right breast calcifications on 3/7/2025. Pathology showed atypical ductal hyperplasia and lobular neoplasia.    She then underwent a right breast lumpectomy and sentinel node biopsy of the two initially biopsied areas, as well as the third area of atypical glandular cells on 2025.  Pathology from the initial two areas showed two foci of invasive ductal carcinoma, largest of which measured 1.6 cm in size, grade 2, with negative margins greater than 2 mm. The third area of atypia seen at biopsy was removed with lumpectomy as well, with pathology that showed invasive carcinoma with ductal and lobular features, grade 1, ER/PA positive, HER2 negative, with negative margins greater than 2 mm. There was no LVI. There were 2 sentinel and 1 nonsentinel nodes removed, which were all negative for metastatic carcinoma. DCIS component was removed as well, grade 2, with focal necrosis, with negative margins (closest was greater than 2 mm). Her Oncotype score was pending at the time of consultation.    Currently, she overall feels well. She denies any breast pain or discomfort. She denies any breast masses or lesions. She denies any bleeding or wound difficulties.      The patient was seen for an opinion regarding radiation options for the diagnosis above. I personally reviewed the available outside records and imaging studies as detailed in the HPI. The allergies, medications, past medical history, surgical history, social history, family history, and review of systems were reviewed.     Prior Radiotherapy:  No radiation treatments to show. (Treatments may have been administered in another system.)       Past Medical History:  Medical History[1]     Past Surgical History:  Surgical History[2]     Family History:  Cancer-related family history includes Pancreatic cancer in her father's sister and paternal grandfather. There is no history of Ovarian cancer or Breast cancer.    Social History:  Social History[3]    Allergies:  Allergies[4]     Medications:  Current Medications[5]      Review of Systems:  Review of Systems - Oncology    Performance Status:  The Karnofsky performance scale today is 90, Able to carry on normal activity; minor signs or symptoms of disease (ECOG equivalent 0).        OBJECTIVE  BP  140/84   Pulse 76   Temp 36.5 °C (97.7 °F) (Temporal)   Resp 18   Wt 89.5 kg (197 lb 5 oz)   SpO2 96%   BMI 30.04 kg/m²    Physical Exam  Vitals reviewed. Exam conducted with a chaperone present.   Constitutional:       General: She is not in acute distress.     Appearance: Normal appearance. She is not ill-appearing.   HENT:      Head: Normocephalic and atraumatic.      Right Ear: External ear normal.      Left Ear: External ear normal.      Mouth/Throat:      Mouth: Mucous membranes are moist.      Pharynx: Oropharynx is clear.   Eyes:      Conjunctiva/sclera: Conjunctivae normal.   Cardiovascular:      Rate and Rhythm: Normal rate.   Pulmonary:      Effort: Pulmonary effort is normal. No respiratory distress.   Chest:   Breasts:     Right: No swelling, bleeding, mass, skin change or tenderness.      Left: No swelling, bleeding, mass, skin change or tenderness.          Comments: The breasts were examined in the supine and upright positions, and are overall symmetrical in appearance. The right breast has a well-healed lumpectomy incision. Otherwise, the remainder of the breast exam exhibits normal breast tissue in the right and left breasts, without any discrete firmness, nodularity, or lesions to palpation. There is no other tenderness or palpable masses. The overlying skin is otherwise normal. There is no appreciable axillary lymphadenopathy in the right or left axilla.   Abdominal:      General: There is no distension.   Musculoskeletal:         General: Normal range of motion.      Cervical back: Normal range of motion and neck supple.   Lymphadenopathy:      Upper Body:      Right upper body: No axillary adenopathy.      Left upper body: No axillary adenopathy.   Skin:     General: Skin is warm and dry.   Neurological:      Mental Status: She is alert and oriented to person, place, and time.   Psychiatric:         Mood and Affect: Mood normal.         Behavior: Behavior normal.          Laboratory  Review:  There are no laboratory contraindications to radiation therapy.    The pertinent lab results were reviewed and discussed with the patient.  Notably, per HPI      Pathology Review:  The pertinent pathology results were reviewed and discussed with the patient.  Notably, per HPI     Imaging:  The pertinent imaging results were reviewed and discussed with the patient.  Notably, per HPI       ASSESSMENT:   Ms. Reyes is a 54 y.o. 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      PLAN:   We had an extensive discussion regarding role of radiation in this setting. We reviewed that the plan for radiation would be deferred until her Oncotype is resulted, and if systemic therapy is recommended, radiation planning would start approximately 4 weeks after completion of therapy.    We reviewed the standard of care for post-lumpectomy treatment for early stage breast cancer, which would include adjuvant radiotherapy per national guidelines. This would consist of hypofractionated radiotherapy, to a dose of 40.05 Gy in 15 fractions. Given her multiple foci of disease, we discussed that she would not be an appropriate candidate for partial breast radiation.    We discussed the acute side effects of therapies and potential late toxicities. During the course of radiation, acute side effects could include, but are not limited to fatigue, dermatitis, or chest wall discomfort. The typical timeline for the resolution of these effects as well as available supportive therapies were reviewed. Potential long term side effects can include, but are not limited to, fibrosis, rib fractures, radiation pneumonitis, and a  small risk of second malignancies.     After the discussion, the patient was given the opportunity to ask questions which were subsequently answered, and our contact information was given.    Please contact our department with any further questions regarding the plan of management.    The length of the visit was []60 minutes. We spent more than 50% of this time discussing treatment options with the patient.    Recommendations:  - Whole breast radiation to a dose of 40.05 Gy in 15 fractions  - CT simulation in the coming weeks .  NCCN Guidelines were applicable to guide this patients treatment plan.   Trixie Smith DO            [1]   Past Medical History:  Diagnosis Date    Breast cancer     GERD (gastroesophageal reflux disease)     Hypertension    [2]   Past Surgical History:  Procedure Laterality Date    BLADDER SUSPENSION      BREAST LUMPECTOMY Right      SECTION, LOW TRANSVERSE  03,04,06    HYSTERECTOMY     [3]   Social History  Tobacco Use    Smoking status: Former     Current packs/day: 0.00     Average packs/day: 1.5 packs/day for 12.8 years (19.2 ttl pk-yrs)     Types: Cigarettes     Start date: 3/4/1991     Quit date: 2004     Years since quittin.9     Passive exposure: Past    Smokeless tobacco: Never   Vaping Use    Vaping status: Never Used   Substance Use Topics    Alcohol use: Never    Drug use: Never   [4] No Known Allergies  [5]   Current Outpatient Medications:     amLODIPine (Norvasc) 5 mg tablet, Take 1 tablet (5 mg) by mouth once daily., Disp: 30 tablet, Rfl: 5    losartan-hydrochlorothiazide (Hyzaar) 100-25 mg tablet, Take 1 tablet by mouth once daily., Disp: 90 tablet, Rfl: 3

## 2025-04-29 ENCOUNTER — HOSPITAL ENCOUNTER (OUTPATIENT)
Dept: RADIATION ONCOLOGY | Facility: CLINIC | Age: 55
Setting detail: RADIATION/ONCOLOGY SERIES
Discharge: HOME | End: 2025-04-29
Payer: COMMERCIAL

## 2025-04-29 ENCOUNTER — SOCIAL WORK (OUTPATIENT)
Dept: CASE MANAGEMENT | Facility: HOSPITAL | Age: 55
End: 2025-04-29

## 2025-04-29 VITALS
RESPIRATION RATE: 18 BRPM | BODY MASS INDEX: 30.04 KG/M2 | DIASTOLIC BLOOD PRESSURE: 84 MMHG | TEMPERATURE: 97.7 F | OXYGEN SATURATION: 96 % | HEART RATE: 76 BPM | WEIGHT: 197.31 LBS | SYSTOLIC BLOOD PRESSURE: 140 MMHG

## 2025-04-29 DIAGNOSIS — Z17.0 MALIGNANT NEOPLASM OF UPPER-OUTER QUADRANT OF RIGHT BREAST IN FEMALE, ESTROGEN RECEPTOR POSITIVE: ICD-10-CM

## 2025-04-29 DIAGNOSIS — C50.411 MALIGNANT NEOPLASM OF UPPER-OUTER QUADRANT OF RIGHT BREAST IN FEMALE, ESTROGEN RECEPTOR POSITIVE: ICD-10-CM

## 2025-04-29 DIAGNOSIS — C50.911 INFILTRATING DUCT AND LOBULAR CARCINOMA OF RIGHT BREAST: Primary | ICD-10-CM

## 2025-04-29 PROCEDURE — 99204 OFFICE O/P NEW MOD 45 MIN: CPT | Performed by: STUDENT IN AN ORGANIZED HEALTH CARE EDUCATION/TRAINING PROGRAM

## 2025-04-29 PROCEDURE — G2211 COMPLEX E/M VISIT ADD ON: HCPCS | Performed by: STUDENT IN AN ORGANIZED HEALTH CARE EDUCATION/TRAINING PROGRAM

## 2025-04-29 PROCEDURE — 99214 OFFICE O/P EST MOD 30 MIN: CPT | Performed by: STUDENT IN AN ORGANIZED HEALTH CARE EDUCATION/TRAINING PROGRAM

## 2025-04-29 ASSESSMENT — ENCOUNTER SYMPTOMS
LOSS OF SENSATION IN FEET: 0
DECREASED CONCENTRATION: 1
ENDOCRINE NEGATIVE: 1
MUSCULOSKELETAL NEGATIVE: 1
CARDIOVASCULAR NEGATIVE: 1
NEUROLOGICAL NEGATIVE: 1
SLEEP DISTURBANCE: 1
RESPIRATORY NEGATIVE: 1
GASTROINTESTINAL NEGATIVE: 1
CONSTITUTIONAL NEGATIVE: 1
DEPRESSION: 0
EYES NEGATIVE: 1
OCCASIONAL FEELINGS OF UNSTEADINESS: 0
BRUISES/BLEEDS EASILY: 1
NERVOUS/ANXIOUS: 1

## 2025-04-29 ASSESSMENT — PATIENT HEALTH QUESTIONNAIRE - PHQ9
1. LITTLE INTEREST OR PLEASURE IN DOING THINGS: NOT AT ALL
SUM OF ALL RESPONSES TO PHQ9 QUESTIONS 1 AND 2: 0
2. FEELING DOWN, DEPRESSED OR HOPELESS: NOT AT ALL

## 2025-04-29 ASSESSMENT — PAIN SCALES - GENERAL: PAINLEVEL_OUTOF10: 0-NO PAIN

## 2025-04-29 NOTE — PROGRESS NOTES
Radiation Oncology Nursing Note    Prior Radiotherapy:  No  No radiation treatments to show. (Treatments may have been administered in another system.)     Current Systemic Treatment:  No     Presence of Pacemaker or ICD:  No    History of Autoimmune or Connective Tissue Disorders:  No    Pain: The patient's current pain level was assessed.  They report currently having a pain of 0 out of 10.  They feel their pain is under control without the use of pain medications.    Review of Systems:  Review of Systems   Constitutional: Negative.    HENT:  Negative.     Eyes: Negative.    Respiratory: Negative.     Cardiovascular: Negative.    Gastrointestinal: Negative.    Endocrine: Negative.    Genitourinary: Negative.     Musculoskeletal: Negative.    Skin: Negative.    Neurological: Negative.    Hematological:  Bruises/bleeds easily.   Psychiatric/Behavioral:  Positive for decreased concentration and sleep disturbance. The patient is nervous/anxious.    Education Documentation  Skin and Nail Changes, taught by Veronique Conroy RN at 4/29/2025 12:58 PM.  Learner: Patient  Readiness: Acceptance  Method: Explanation  Response: Verbalizes Understanding    Fatigue, taught by Veronique Conroy RN at 4/29/2025 12:58 PM.  Learner: Patient  Readiness: Acceptance  Method: Explanation  Response: Verbalizes Understanding    Radiation Therapy, taught by Veronique Conroy RN at 4/29/2025 12:58 PM.  Learner: Patient  Readiness: Acceptance  Method: Explanation  Response: Verbalizes Understanding    Treatment Plan and Schedule, taught by Veronique Conroy RN at 4/29/2025 12:58 PM.  Learner: Patient  Readiness: Acceptance  Method: Explanation  Response: Verbalizes Understanding    Radiation Therapy (External Beam) : What Is Radiation Therapy, taught by Veronique Conroy RN at 4/29/2025 12:58 PM.  Learner: Patient  Readiness: Acceptance  Method: Explanation  Response: Verbalizes Understanding    Radiation Therapy (External Beam) : Side Effects, taught by  Veronique Conroy RN at 4/29/2025 12:58 PM.  Learner: Patient  Readiness: Acceptance  Method: Explanation  Response: Verbalizes Understanding    Radiation Therapy (External Beam) : Review, taught by Veronique Conroy RN at 4/29/2025 12:58 PM.  Learner: Patient  Readiness: Acceptance  Method: Explanation  Response: Verbalizes Understanding    Radiation Therapy (External Beam) : Life During Treatment, taught by Veronique Conroy RN at 4/29/2025 12:58 PM.  Learner: Patient  Readiness: Acceptance  Method: Explanation  Response: Verbalizes Understanding    Radiation Therapy (External Beam) : Getting Radiation, taught by Veronique Conroy RN at 4/29/2025 12:58 PM.  Learner: Patient  Readiness: Acceptance  Method: Explanation  Response: Verbalizes Understanding    Education Comments  04/29/25 1346 Veronique Conroy RN    We  reviewed what the Ct/sim will entail including but not limited to arms above head vac bag, knee sponge, wire scar, and tattooing. We also reviewed side effects of radiation to the breast including but not limited to skin irritation, breast changes, and fatigue and ways to manage side effects. Patient asking appropriate questions. Patient verbalizes understanding with verbal teach back. Veronique Conroy, MSN, RN, OCN     04/29/25 1300 Veronique Conroy RN  New patient here today to see Dr. Smith for right breast invasive ductal lobular carcinoma and DCIS, node (-), grade 2, (-) margins, ER/AK (+), Her 2 (-), and Oncotype is in process. Patient states that she is well healed. Patient states that she has good mobility of her arms and is able to raise her arms above her head without difficulty. Dr. Smith did perform a breast exam on patient with myself present.

## 2025-04-29 NOTE — PROGRESS NOTES
SW met with patient today while she was here for a consultation. She was alone at her consult. She reports that her  is her main support. She has 3 children as well as her sister. She is very independent in all areas. 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 2 of her daughters are in college with one graduating next week. She reports hat she will not need anything for time off work but will plan to come here after work for her treatments and then go home to sleep.  Pt stated that she did rate herself a  4/10 on the distress screen due to finances but it is just everyday needs, nothing specific. She reports no needs or concerns at this time.   SW provided a business card and will remain available for future needs.

## 2025-05-02 LAB — SCAN RESULT: NORMAL

## 2025-05-21 ENCOUNTER — PATIENT OUTREACH (OUTPATIENT)
Dept: HEMATOLOGY/ONCOLOGY | Facility: CLINIC | Age: 55
End: 2025-05-21
Payer: COMMERCIAL

## 2025-05-21 ENCOUNTER — OFFICE VISIT (OUTPATIENT)
Dept: HEMATOLOGY/ONCOLOGY | Facility: CLINIC | Age: 55
End: 2025-05-21
Payer: COMMERCIAL

## 2025-05-21 VITALS
BODY MASS INDEX: 30.07 KG/M2 | TEMPERATURE: 98.1 F | HEART RATE: 72 BPM | HEIGHT: 68 IN | DIASTOLIC BLOOD PRESSURE: 92 MMHG | WEIGHT: 198.41 LBS | RESPIRATION RATE: 18 BRPM | SYSTOLIC BLOOD PRESSURE: 140 MMHG | OXYGEN SATURATION: 95 %

## 2025-05-21 DIAGNOSIS — Z78.0 POSTMENOPAUSAL: Primary | ICD-10-CM

## 2025-05-21 DIAGNOSIS — Z17.0 MALIGNANT NEOPLASM OF UPPER-OUTER QUADRANT OF RIGHT BREAST IN FEMALE, ESTROGEN RECEPTOR POSITIVE: ICD-10-CM

## 2025-05-21 DIAGNOSIS — C50.411 MALIGNANT NEOPLASM OF UPPER-OUTER QUADRANT OF RIGHT BREAST IN FEMALE, ESTROGEN RECEPTOR POSITIVE: ICD-10-CM

## 2025-05-21 PROCEDURE — 3080F DIAST BP >= 90 MM HG: CPT | Performed by: INTERNAL MEDICINE

## 2025-05-21 PROCEDURE — 99205 OFFICE O/P NEW HI 60 MIN: CPT | Performed by: INTERNAL MEDICINE

## 2025-05-21 PROCEDURE — 3077F SYST BP >= 140 MM HG: CPT | Performed by: INTERNAL MEDICINE

## 2025-05-21 PROCEDURE — 3008F BODY MASS INDEX DOCD: CPT | Performed by: INTERNAL MEDICINE

## 2025-05-21 PROCEDURE — 99215 OFFICE O/P EST HI 40 MIN: CPT | Performed by: INTERNAL MEDICINE

## 2025-05-21 SDOH — ECONOMIC STABILITY: FOOD INSECURITY: WITHIN THE PAST 12 MONTHS, THE FOOD YOU BOUGHT JUST DIDN'T LAST AND YOU DIDN'T HAVE MONEY TO GET MORE.: NEVER TRUE

## 2025-05-21 SDOH — HEALTH STABILITY: PHYSICAL HEALTH: ON AVERAGE, HOW MANY MINUTES DO YOU ENGAGE IN EXERCISE AT THIS LEVEL?: 0 MIN

## 2025-05-21 SDOH — ECONOMIC STABILITY: INCOME INSECURITY: IN THE LAST 12 MONTHS, WAS THERE A TIME WHEN YOU WERE NOT ABLE TO PAY THE MORTGAGE OR RENT ON TIME?: NO

## 2025-05-21 SDOH — ECONOMIC STABILITY: FOOD INSECURITY: WITHIN THE PAST 12 MONTHS, YOU WORRIED THAT YOUR FOOD WOULD RUN OUT BEFORE YOU GOT MONEY TO BUY MORE.: NEVER TRUE

## 2025-05-21 SDOH — HEALTH STABILITY: PHYSICAL HEALTH: ON AVERAGE, HOW MANY DAYS PER WEEK DO YOU ENGAGE IN MODERATE TO STRENUOUS EXERCISE (LIKE A BRISK WALK)?: 0 DAYS

## 2025-05-21 SDOH — ECONOMIC STABILITY: TRANSPORTATION INSECURITY
IN THE PAST 12 MONTHS, HAS LACK OF TRANSPORTATION KEPT YOU FROM MEETINGS, WORK, OR FROM GETTING THINGS NEEDED FOR DAILY LIVING?: NO

## 2025-05-21 SDOH — ECONOMIC STABILITY: TRANSPORTATION INSECURITY
IN THE PAST 12 MONTHS, HAS THE LACK OF TRANSPORTATION KEPT YOU FROM MEDICAL APPOINTMENTS OR FROM GETTING MEDICATIONS?: NO

## 2025-05-21 ASSESSMENT — SOCIAL DETERMINANTS OF HEALTH (SDOH)
HOW OFTEN DO YOU GET TOGETHER WITH FRIENDS OR RELATIVES?: ONCE A WEEK
WITHIN THE LAST YEAR, HAVE YOU BEEN KICKED, HIT, SLAPPED, OR OTHERWISE PHYSICALLY HURT BY YOUR PARTNER OR EX-PARTNER?: NO
WITHIN THE LAST YEAR, HAVE YOU BEEN AFRAID OF YOUR PARTNER OR EX-PARTNER?: NO
DO YOU BELONG TO ANY CLUBS OR ORGANIZATIONS SUCH AS CHURCH GROUPS UNIONS, FRATERNAL OR ATHLETIC GROUPS, OR SCHOOL GROUPS?: NO
HOW HARD IS IT FOR YOU TO PAY FOR THE VERY BASICS LIKE FOOD, HOUSING, MEDICAL CARE, AND HEATING?: NOT HARD AT ALL
IN A TYPICAL WEEK, HOW MANY TIMES DO YOU TALK ON THE PHONE WITH FAMILY, FRIENDS, OR NEIGHBORS?: MORE THAN THREE TIMES A WEEK
WITHIN THE LAST YEAR, HAVE TO BEEN RAPED OR FORCED TO HAVE ANY KIND OF SEXUAL ACTIVITY BY YOUR PARTNER OR EX-PARTNER?: NO
HOW OFTEN DO YOU ATTENT MEETINGS OF THE CLUB OR ORGANIZATION YOU BELONG TO?: NEVER
HOW OFTEN DO YOU ATTEND CHURCH OR RELIGIOUS SERVICES?: NEVER
WITHIN THE LAST YEAR, HAVE YOU BEEN HUMILIATED OR EMOTIONALLY ABUSED IN OTHER WAYS BY YOUR PARTNER OR EX-PARTNER?: NO
IN THE PAST 12 MONTHS, HAS THE ELECTRIC, GAS, OIL, OR WATER COMPANY THREATENED TO SHUT OFF SERVICE IN YOUR HOME?: NO

## 2025-05-21 ASSESSMENT — LIFESTYLE VARIABLES
AUDIT-C TOTAL SCORE: 0
SKIP TO QUESTIONS 9-10: 1
HOW MANY STANDARD DRINKS CONTAINING ALCOHOL DO YOU HAVE ON A TYPICAL DAY: PATIENT DOES NOT DRINK
HOW OFTEN DO YOU HAVE SIX OR MORE DRINKS ON ONE OCCASION: NEVER
HOW OFTEN DO YOU HAVE A DRINK CONTAINING ALCOHOL: NEVER

## 2025-05-21 ASSESSMENT — PATIENT HEALTH QUESTIONNAIRE - PHQ9
1. LITTLE INTEREST OR PLEASURE IN DOING THINGS: NOT AT ALL
2. FEELING DOWN, DEPRESSED OR HOPELESS: NOT AT ALL
SUM OF ALL RESPONSES TO PHQ9 QUESTIONS 1 & 2: 0

## 2025-05-21 ASSESSMENT — PAIN SCALES - GENERAL: PAINLEVEL_OUTOF10: 0-NO PAIN

## 2025-05-21 NOTE — PATIENT INSTRUCTIONS
Bone density test   Consider taking anastrozole after completion of radiation  Follow-up with Me in about 7 wks

## 2025-05-22 NOTE — PROGRESS NOTES
Patient here with her  to establish care with Dr. King. Medications and allergies reviewed with patient. Patient referred to our office by Dr. Cohen.     Patient reports she is feeling well overall. She reports some feelings of anxiety and difficulty sleeping since diagnosis. She states that she spoke with our  a couple days prior. Patient denies pain, nausea, vomiting, diarrhea, constipation, difficulty eating or weight loss.     Per order patient to get baseline DEXA scan and follow up in about 8 weeks after radiation is complete. Discussed anastrozole with patient including side effects, print out given for patient to review. Phone number provided to call with any questions.     Patient verbalized understanding using the teach back method, no further questions at this time.

## 2025-05-23 ASSESSMENT — ENCOUNTER SYMPTOMS
DIZZINESS: 0
EYE PROBLEMS: 0
ADENOPATHY: 0
NERVOUS/ANXIOUS: 0
SCLERAL ICTERUS: 0
HEMOPTYSIS: 0
BRUISES/BLEEDS EASILY: 0
APPETITE CHANGE: 0
RESPIRATORY NEGATIVE: 1
DIAPHORESIS: 0
CONSTITUTIONAL NEGATIVE: 1
NAUSEA: 0
FATIGUE: 0
HOT FLASHES: 0
EYES NEGATIVE: 1
ABDOMINAL DISTENTION: 0
CHEST TIGHTNESS: 0
BLOOD IN STOOL: 0
COUGH: 0
DIFFICULTY URINATING: 0
SHORTNESS OF BREATH: 0
NUMBNESS: 0
MYALGIAS: 0
BACK PAIN: 0
HEMATURIA: 0
DIARRHEA: 0
ARTHRALGIAS: 0
FREQUENCY: 0
HEADACHES: 0
LEG SWELLING: 0
CONFUSION: 0
PALPITATIONS: 0
ABDOMINAL PAIN: 0
SEIZURES: 0
CHILLS: 0
DYSURIA: 0
CARDIOVASCULAR NEGATIVE: 1
DEPRESSION: 0
SLEEP DISTURBANCE: 0
WHEEZING: 0
EXTREMITY WEAKNESS: 0
FEVER: 0
CONSTIPATION: 0

## 2025-05-23 NOTE — PROGRESS NOTES
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 woman with recent diagnosis of mammographically detected multifocal stage 1 ER/TN+ and HER2- breast cancer with largest focus measuring 16 mm (other two foci measured 8 mm and 1 mm, respectively). Margins negative. 0/2 SLN. She is post-menopausal s/p hysterectomy in 2019 at age 48. No hot flushes. Pathology report makes no mention of ovaries.             Review of Systems   Constitutional: Negative.  Negative for appetite change, chills, diaphoresis, fatigue and fever.   HENT:  Negative.  Negative for hearing loss and lump/mass.    Eyes: Negative.  Negative for eye problems and icterus.   Respiratory: Negative.  Negative for chest tightness, cough, hemoptysis, shortness of breath and wheezing.    Cardiovascular: Negative.  Negative for chest pain, leg swelling and palpitations.   Gastrointestinal:  Negative for abdominal distention, abdominal pain, blood in stool, constipation, diarrhea and nausea.   Endocrine: Negative for hot flashes.   Genitourinary:  Negative for bladder incontinence, difficulty urinating, dyspareunia, dysuria, frequency and hematuria.    Musculoskeletal:  Negative for arthralgias, back pain, gait problem and myalgias.   Neurological:  Negative for dizziness, extremity weakness, gait problem, headaches, numbness and seizures.   Hematological:  Negative for adenopathy. Does not bruise/bleed easily.   Psychiatric/Behavioral:  Negative for confusion, depression and sleep disturbance. The patient is not nervous/anxious.    All other systems reviewed and are negative.        Past Medical History:  has a  "past medical history of Breast cancer, GERD (gastroesophageal reflux disease) (), and Hypertension ().  Surgical History:   has a past surgical history that includes  section, low transverse (,,); Hysterectomy (); Breast lumpectomy (Right); and Bladder suspension ().  Social History:   reports that she quit smoking about 21 years ago. Her smoking use included cigarettes. She started smoking about 34 years ago. She has a 19.2 pack-year smoking history. She has been exposed to tobacco smoke. She has never used smokeless tobacco. She reports that she does not drink alcohol and does not use drugs.  Family History:  Family History[1]  Family Oncology History:  Cancer-related family history includes Pancreatic cancer in her father's sister and paternal grandfather. There is no history of Ovarian cancer or Breast cancer.      OBJECTIVE    VS / Pain:  BP (!) 140/92 (BP Location: Left arm, Patient Position: Sitting, BP Cuff Size: Adult long)   Pulse 72   Temp 36.7 °C (98.1 °F) (Temporal)   Resp 18   Ht (S) 1.722 m (5' 7.8\")   Wt 90 kg (198 lb 6.6 oz)   SpO2 95%   BMI 30.35 kg/m²   BSA: 2.07 meters squared   Pain Scale: 0    Performance Status:  The ECOG performance scale today is ECO- Fully active, able to carry on all pre-disease performance w/o restriction.          Physical Exam  Constitutional:       General: She is not in acute distress.     Appearance: She is not ill-appearing, toxic-appearing or diaphoretic.   HENT:      Nose: No congestion or rhinorrhea.      Mouth/Throat:      Pharynx: No posterior oropharyngeal erythema.   Cardiovascular:      Rate and Rhythm: Normal rate and regular rhythm.      Pulses: Normal pulses.      Heart sounds: Normal heart sounds. No murmur heard.     No gallop.   Pulmonary:      Breath sounds: Normal breath sounds.   Abdominal:      General: There is no distension.      Palpations: There is no mass.      Tenderness: There is no abdominal " "tenderness.   Musculoskeletal:         General: No swelling.      Cervical back: No rigidity.      Right lower leg: No edema.      Left lower leg: No edema.   Lymphadenopathy:      Cervical: No cervical adenopathy.   Skin:     General: Skin is warm.      Coloration: Skin is not cyanotic.      Findings: No bruising, ecchymosis or erythema.   Neurological:      General: No focal deficit present.      Mental Status: She is oriented to person, place, and time.      Cranial Nerves: Cranial nerves 2-12 are intact.      Motor: Motor function is intact.   Psychiatric:         Attention and Perception: Attention and perception normal.         Mood and Affect: Mood and affect normal.         Behavior: Behavior normal.         Thought Content: Thought content normal.         Judgment: Judgment normal.           Diagnostic Results   === 11/19/24 ===    CT CARDIAC SCORING WO IV CONTRAST    Addendum 11/21/2024  6:54 PM -----------------------------------------------  Interpreted By:  Ty Najera,  ADDENDUM:  An approximate 1.8 cm hypodensity of fluid collection is present  along the right cardiac margin most consistent with a bronchogenic  cyst.    Signed by: Ty Najear 11/21/2024 6:54 PM    -------- ORIGINAL REPORT --------  Dictation workstation:   [id number]    - Impression -  Coronary artery calcium score of  0.  Please note that up to 5% of patients with a negative exam could have  significant noncalcified plaque, and therefore have a falsely \"  negative\" CT coronary calcium score exam.        Coronary artery calcium scoring may be helpful in predicting the risk  for future coronary heart disease events.  According to the American  College of Cardiology Foundation Clinical Expert Consensus Task  Force, such testing provides important prognostic information in  patients with more than one coronary heart disease risk factor. The  coronary artery calcium score correlates with the annual risk of a  non-fatal myocardial " "infarction or coronary heart disease death.    Coronary artery score            Annual Risk    0-99                               0.4%  100-399                          1.3%  >400                              2.4%    These three \"breakpoints\" correspond to lower, intermediate and high  risk states for future coronary events.  Such information should be  used, along with appropriate clinical judgment, to make decisions  regarding the intensity of risk factor management strategies to treat  blood lipids and to modify other non-lipid coronary risk factors.    Reference: [place] P et al. Circulation.  2007; 115:402-426    Signed by: Ty Najera 11/21/2024 6:51 PM  Dictation workstation:   [id number]      BI mammo right diagnostic tomosynthesis 03/07/2025    Narrative  Interpreted By:  Jerad Rincon,  STUDY:  BI MAMMO RIGHT DIAGNOSTIC TOMOSYNTHESIS;  3/7/2025 9:08 am    ACCESSION NUMBER(S):  [id number]    ORDERING CLINICIAN:  ALVARADO NORRIS    INDICATION:  Preprocedural diagnostic mammogram of the right breast to evaluate  the anterior extent of right breast calcifications and a possible  anterior right breast mass circled on exam dated 12/13/2024. The  patient had recent biopsy-proven right breast invasive ductal  carcinoma at 11 o'clock 7 cm from the nipple as well as atypical  glandular cells which were stereotactically biopsied with a right  breast mass and associated microcalcifications.    ,C50.411 Malignant neoplasm of upper-outer quadrant of right female  breast,Z17.0 Estrogen receptor positive status (ER+)    COMPARISON:  12/13/2024, 01/06/2025 and 01/28/2025.    FINDINGS:  MAMMOGRAPHY: 2D and tomosynthesis images were reviewed at 1 mm slice  thickness.    Density:  There are scattered areas of fibroglandular density.    The mass/focal asymmetry suggested within the slightly lateral,  slightly superior right breast at anterior depth which was circled on  exam dated 12/13/2024 resolves into fibroglandular and " fatty breast  tissue on today's additional views. Spot magnification views of the  upper-outer quadrant demonstrate segmental pleomorphic calcifications  extending anteriorly from the patient's biopsy-proven right breast  malignancy and measuring up to 3.9 cm in AP diameter.    Impression  Suspicious right breast calcifications. Plan is to proceed with the  patient's scheduled same day biopsy of the anterior most extent of  the calcifications. The findings and recommendations were discussed  with the patient prior to the biopsy by Dr. Jerad Rincon.    Resolved right breast mass/focal asymmetry.    BI-RADS CATEGORY:  BI-RADS Category:  4 Suspicious.  Recommendation:  Surgical Consultation and Biopsy.  Recommended Date:  Immediate.  Laterality:  Right.    For any future breast imaging appointments, please call [phone]).    MACRO:  None    Signed by: Jerad Rincon 3/7/2025 10:20 AM  Dictation workstation:   [id number]     No images are attached to the encounter.    LABORATORY/PATHOLOGY DATA    No visits with results within 6 Week(s) from this visit.   Latest known visit with results is:   Admission on 04/02/2025, Discharged on 04/02/2025   Component Date Value Ref Range Status    Case Report 04/02/2025    Final                    Value:Surgical Pathology                                Case: [id number]                                  Authorizing Provider:  Shannan Cohen MD            Collected:           04/02/2025 1242              Ordering Location:     [facility] Received:            04/02/2025 1413                                     OR                                                                           Pathologist:           Zulma Lyles MD                                                       Specimens:   A) - BREAST LUMPECTOMY RIGHT, Right breast lumpectomy 11 o'clock and right ADH with 2               wires; short stitch=superior, long=lateral                                                           B) - BREAST, EXCISION OF MASS RIGHT, Right breast tissue atypia with magseed; short                 stitch=superior, long stitch=lateral                                                                C) - BREAST MARGIN RIGHT, Right breast superior shave margin; true superior margin                                            marked with clip and red paint                                                                      D) - BREAST MARGIN RIGHT, Right breast lateral shave margin; true lateral margin                    marked with clip and orange paint                                                                   E) - BREAST MARGIN RIGHT, Right breast anterior shave margin; true anterior margin                  marked with clip and green paint                                                                    F) - BREAST MARGIN RIGHT, Right breast posterior shave margin; true posterior margin                marked with clip and black paint                                                                    G) - BREAST MARGIN RIGHT, Right breast medial shave margin; true medial margin marked               with clip and yellow paint                                                                          H) - BREAST MARGIN RIGHT, Right breast inferior shave margin; true inferior margin                                            marked with clip and blue paint                                                                     I) - SENTINEL LYMPH NODE BREAST RIGHT, Right axillary sentinel lymph node #1 target                 count=2                                                                                             J) - SENTINEL LYMPH NODE BREAST RIGHT, Right axillary sentinel lymph node #2 target                 count=4                                                                                    FINAL DIAGNOSIS 04/02/2025    Final                    Value:A.Right  breast, wire localized lumpectomy 11:00 and right atypical ductal hyperplasia with 2 wires:   -- Two foci of invasive ductal carcinoma and ductal carcinoma in situ , see synoptic report and see synoptic biomarker summary for smaller mass.  -- Lobular carcinoma in situ, classic type.   -- Previous biopsy site changes x 2.   -- One intramammary  lymph node, negative for carcinoma (0/1), see note.    Note: The largest focus invasive carcinoma measures up to 16 mm and 1 mm respectively. Larger focus of invasive ductal carcinoma is grade 2 and smaller focus (A12) is grade 1.  No extranodal extension is identified. Multiple deeper levels were reviewed in the evaluation of this specimen. Immunohistochemical stain for cytokeratin AE1/3 is negative in lymph node.  E-cadherin immunostain is positive in tumor cells and supports ductal phenotype.  p63 and SMMHC immunostains show loss of myoepithelial layer in the smaller focus of invasive carcinoma and supports above diagnosis.     B.                           Right breast with atypia, mag seed localized lumpectomy:   -- Invasive carcinoma with ductal and lobular features, grade 1 and ductal carcinoma in situ, see synoptic report. See synoptic biomarker summary.   -- Lobular carcinoma in situ, classic type.   -- Previous biopsy site changes.     Note: Based on microscopic examination and three dimensional reconstruction, largest focus invasive carcinoma measures up to measures, 8 mm.  E-cadherin immunostain shows variable immunostaining in tumor cells and supports ductal and lobular phenotype.     C. Right breast, superior margin, excision:  -- Negative for carcinoma.      D. Right breast, lateral margin, excision:  -- Negative for carcinoma.     E. Right breast, anterior margin, excision:  -- Negative for carcinoma.     F. Right breast, posterior margin, excision:  -- Negative for carcinoma.     G. Right breast, medial margin, excision:  -- Negative for carcinoma.     H. Right  breast, inferior margin, excision:  -- Lobular carcinoma in                           situ, classic type, see note.   -- Negative for invasive carcinoma and ductal carcinoma in situ.     Note: E-cadherin immunostain show absent/reduced expression in the focus of lobular carcinoma in situ and supports above diagnosis.     I. Right breast, sentinel lymph node # 1, excision:    -- One lymph node, negative for carcinoma (0/1), see note.    Note: Multiple deeper levels were reviewed in the evaluation of this specimen. Immunohistochemical stain for cytokeratin AE1/3 is negative.       J. Right breast, sentinel lymph node # 2, excision:    -- One lymph node, negative for carcinoma (0/1), see note.    Note: Multiple deeper levels were reviewed in the evaluation of this specimen. Immunohistochemical stain for cytokeratin AE1/3 is negative.       : Dr Lenka Perkins      Slides are reviewed at breast review conference via  Zoom meeting.             04/02/2025    Final                    Value:By the signature on this report, the individual or group listed as making the Final Interpretation/Diagnosis certifies that they have reviewed this case.       Case Summary Report 04/02/2025    Final                    Value:INVASIVE CARCINOMA OF THE BREAST: Resection                            INVASIVE CARCINOMA OF THE BREAST: RESECTION - All Specimens                            8th Edition - Protocol posted: 6/19/2024                                                        SPECIMEN                               Procedure:    Excision (less than total mastectomy)                                Specimen Laterality:    Right                                                         TUMOR                               Tumor Site:    Clock position                                :    11 o'clock                              Histologic Type:    Invasive carcinoma of no special type (ductal)                                Histologic  Type Comment:    Two foci of invasive ductal carcinoma in part A and one focus of invasive carcinoma with ductal and lobular features in part B                              Histologic Grade ([place] Histologic Score):                                   Glandular (Acinar) / Tubular Differentiation:    Score 3                                Nuclear Pleomorphism:    Score 2                                Mitotic Rate:    Score 1                                Overall Grade:    Grade 2 (scores of 6 or 7)                              Tumor Size:    Greatest dimension of largest invasive focus (Millimeters): Based on microscopic examination and three dimensional reconstruction measures,  16 mm                             Tumor Focality:    Multiple foci of invasive carcinoma                                Number of Foci:    3                                Sizes of Individual Foci in Millimeters (mm):    16 mm; 8 mm; 1 mm                              Ductal Carcinoma In Situ (DCIS):    Present                                :    Negative for extensive intraductal component (EIC)                                Architectural Patterns:    Cribriform                                Architectural Patterns:    Solid                                Nuclear Grade:    Grade II (intermediate)                                Necrosis:    Present, focal (small foci or single cell necrosis)                              Lymphatic and / or Vascular Invasion:    Not identified                              Dermal Lymphatic and / or Vascular Invasion:    Not identified                              Microcalcifications:    Present in DCIS                              Microcalcifications:    Present in invasive carcinoma                              Microcalcifications:    Present in non-neoplastic tissue                              Treatment Effect in the Breast:    No known presurgical therapy                                                          MARGINS                             Margin Status for Invasive Carcinoma:    All margins negative for invasive carcinoma                                Distance from Invasive Carcinoma to Closest Margin:    Greater than: 2 mm                             Margin Status for DCIS:    All margins negative for DCIS                                Distance from DCIS to Closest Margin:    Greater than: 2 mm                             Margin Comment:    Margins assessment includes the additional final margins for both lumpectomy (specimens C through H).                                                         REGIONAL LYMPH NODES                             Regional Lymph Node Status:                                   :    All regional lymph nodes negative for tumor                                Total Number of Lymph Nodes Examined (sentinel and non-sentinel):    3                                Number of Crane Nodes Examined:    2                                                         pTNM CLASSIFICATION (AJCC 8th Edition)                               Reporting of pT, pN, and (when applicable) pM categories is based on information available to the pathologist at the time the report is issued. As per the AJCC (Chapter 1, 8th Ed.) it is the managing physician's responsibility to establish the final pathologic stage based upon all pertinent information, including but potentially not limited to this pathology report.                             pT Category:    pT1c                              T Suffix:    (m)                              pN Category:    pN0                              N Suffix:    (sn)                                                         ADDITIONAL FINDINGS                             Additional Findings:    Atypical ductal hyperplasia, columnar cell changes, apocrine cysts, fibroadenomatous changes and ususal ductal hyperplasia                                                         SPECIAL  STUDIES                               Estrogen Receptor (ER) Status:    Positive (greater than 10% of cells demonstrate nuclear positivity)                                  Percentage of Cells with Nuclear Positivity:    > 95 %                               Progesterone Receptor (PgR) Status:    Positive                                  Percentage of Cells with Nuclear Positivity:    5 %                               HER2 (by immunohistochemistry):    Negative (Score 1+)                                Testing Performed on Case Number:    [id number] A1. See synoptic biomarker summary for other two masses.                             Breast Biomarker Reporting Template                            BREAST BIOMARKER REPORTING TEMPLATE - A                            Protocol posted: 12/13/2023                                                           Test(s) Performed:                                     Estrogen Receptor (ER) Status:    Positive (greater than 10% of cells demonstrate nuclear positivity)                                    Percentage of Cells with Nuclear Positivity:    81-90%                                    Average Intensity of Staining:    Moderate                                  Test Type:    Food and Drug Administration (FDA) cleared (test / vendor): Roche CONFIRM anti-(ER) (SP1) Rabbit Monoclonal Primary Antibody, Roche Multimer Detection                                  Primary Antibody:    SP1                                  Scoring System:    No separate scoring system used                                Test(s) Performed:                                     Progesterone Receptor (PgR) Status:    Positive                                    Percentage of Cells with Nuclear Positivity:    61-70%                                    Average Intensity of Staining:    Moderate                                  Test Type:    Food and Drug Administration (FDA) cleared (test / vendor): Roche CONFIRM  anti-(MO) (1E2) Rabbit Monoclonal Primary Anitbody, Roche Multimer Detection                                  Primary Antibody:    1E2                                  Scoring System:    No separate scoring system used                                Test(s) Performed:                                     HER2 by Immunohistochemistry:    Negative (Score 0)                                  Test Type:    Food and Drug Administration (FDA) cleared (test / vendor): Roche Pathway anti-HER-2/jose (4B5) Rabbit Monoclonal Primary Antibody, Roche Multimer Detection                                  Primary Antibody:    4B5                                Cold Ischemia and Fixation Times:    Meet requirements specified in latest version of the ASCO / CAP Guidelines                                Testing Performed on Block Number(s):    A12                                                         METHODS                               Fixative:    Formalin                                Image Analysis:    Not performed                                                            Comment(s):    The biomarkers are reported on smaller focus of invasive ductal carcinoma, grade 1, measuring 1 mm.                             Breast Biomarker Reporting Template                            BREAST BIOMARKER REPORTING TEMPLATE - B                            Protocol posted: 12/13/2023                                                           Test(s) Performed:                                     Estrogen Receptor (ER) Status:    Positive (greater than 10% of cells demonstrate nuclear positivity)                                    Percentage of Cells with Nuclear Positivity:    %                                    Average Intensity of Staining:    Strong                                  Test Type:    Food and Drug Administration (FDA) cleared (test / vendor): Roche CONFIRM anti-(ER) (SP1) Rabbit Monoclonal Primary Antibody, Roche Multimer  Detection                                  Primary Antibody:    SP1                                  Scoring System:    No separate scoring system used                                Test(s) Performed:                                     Progesterone Receptor (PgR) Status:    Positive                                    Percentage of Cells with Nuclear Positivity:    %                                    Average Intensity of Staining:    Strong                                  Test Type:    Food and Drug Administration (FDA) cleared (test / vendor): Roche CONFIRM anti-(MS) (1E2) Rabbit Monoclonal Primary Anitbody, Roche Multimer Detection                                  Primary Antibody:    1E2                                  Scoring System:    No separate scoring system used                                Test(s) Performed:                                     HER2 by Immunohistochemistry:    Negative (Score 1+)                                  Test Type:    Food and Drug Administration (FDA) cleared (test / vendor): Roche Pathway anti-HER-2/jose (4B5) Rabbit Monoclonal Primary Antibody, Roche Multimer Detection                                  Primary Antibody:    4B5                                Cold Ischemia and Fixation Times:    Meet requirements specified in latest version of the ASCO / CAP Guidelines                                Testing Performed on Block Number(s):    B3                                                         METHODS                               Fixative:    Formalin                                Image Analysis:    Not performed       Block for Additional Biomarkers/Mo* 04/02/2025    Final                    Value:Normal Block: A1  Tumor Block: A3 (largest mass), A12 (smallest mass), B3 ( second largest mass)      Clinical History 04/02/2025    Final                    Value:Pre-op diagnosis:  Malignant neoplasm of upper-outer quadrant of right breast in female, estrogen receptor  "positive [C50.411, Z17.0]      Gross Description 04/02/2025    Final                    Value:A: Received fresh in a Dubin device, labeled with the patient´s name and hospital number and \"right breast lumpectomy 11:00 and right ADH with 2 wires\", is an irregular segment of yellow lobulated fibrofatty tissue, 6.0 (medial-lateral) x 5.8 (superior-inferior) x 2.3 (anterior-posterior) cm.  A skin ellipse is not present.  The accompanying radiograph demonstrates two wires with associated biopsy clips, a mass, and a span of calcifications The specimen is oriented with a short superior and a long lateral stitch.  A localizing needle is not identified.  The specimen is inked in the following manner:  anterior green, posterior black, superior red, inferior blue, medial yellow, and lateral orange. The specimen is serially sectioned from superior to inferior into 14 slices maintaining the bracketed area of concern.  The cut surface reveals a mass and an area of lesional fibrous tissue, each with a separate biopsy site.  The mass is ill-defined, firm, and gray-white measuring 1.6 x 1.2 x                           1.1 cm. The mass is located in slices 4-6, 0.1 cm from the anterior margin, 0.3 cm from the posterior margin, 1.0 cm from the superior margin, 3.6 cm from the inferior margin, 2.5 cm from the lateral margin, and 2.6 cm from the medial margin.  A hemorrhagic biopsy cavity with open coil clip is identified measuring 0.8 x 0.6 x 0.5 cm and is located 0.2 cm from the anterior margin.  The lesional fibrous tissue shows hemorrhage, fibrocystic nodularity, and scant calcifications and involves an area measuring approximately 1.8 x 1.5 x 1.2 cm.  There is a second hemorrhagic biopsy cavity containing an hourglass clip measuring 0.6 x 0.5 x 0.4 cm and is located 0.2 cm from the anterior and inferior margins.  The 2 biopsy sites measure 3.2 cm apart and the entire bracket area involves slices 4-13 measuring approximately 4.2 x " "3.8 x 1.7 cm.  The remainder of the breast parenchyma contains 30% dense fibrous tissue overall with central hemorrhagic discoloration and friable areas about the anterior                           and inferior aspects.  No other lesions are identified.  A photograph has been taken.  Representative sections are submitted in 21 cassettes.  [facility]    NOTE:  Ischemia time: 12:42 on 04/02/2025.  This specimen was placed into formalin at: 13:18 on 04/02/2025.     Summary of Cassettes:  Specimen Label Site  A  1 Slice 1, superior end (representative perpendicular)  2 Slice 3, uninvolved superior to mass and bracketed area  3 Slice 4, mass and bracketed area at most superior aspect  4-6 Slice 5, mass with bracket area (entire slice trisected)  7 Slice 6, remaining mass with biopsy site including bracketed area  8-9 Slice 7, uninvolved directly inferior to mass and bracketed area between biopsy sites   10-11 Slice 8, bracketed area between biopsy sites (entire slice bisected)  12 Slice 9, bracketed area between biopsy sites directly superior to lesional fibrous tissue (anterior half of slice)  13 Slice 10, lesional fibrous tissue at most superior with bracket area   14-15 Slice 11,                           lesional fibrous tissue with bracket area (entire slice bisected)  16-17 Slice 12, lesional fibrous tissue with biopsy site bracketed area (entire slice bisected)  18-19 Slice 13, remaining lesional fibrous tissue and biopsy site bracketed area (entire slice bisected)  20-21 Slice 14, inferior end (entire perpendicular)  B: Received fresh in a Dubin device, labeled with the patient´s name and hospital number and \"right breast tissue atypia with Mag seed\", is an irregular segment of yellow lobulated fibrofatty tissue, 4.7 (medial-lateral) x 3.5 (superior-inferior) x 1.5 (anterior-posterior) cm.  A skin ellipse is not present.  The accompanying radiograph demonstrates a Mag seed in the specimen. The specimen is oriented with " a short superior and a long lateral stitch.  A localizing needle is not identified.  The specimen is inked in the following manner:  anterior green, posterior black, superior red, inferior blue, medial yellow, and lateral orange. The specimen is serially sectioned                           from medial to lateral into 12 slices.  The cut surface reveals a hemorrhagic biopsy cavity containing synthetic gel and measuring 0.9 x 0.9 x 0.5 cm.  The biopsy cavity is located in slices 4-6, 0.5 cm from the anterior margin, 0.3 cm from the posterior margin, 2.6 cm from the superior margin, less than 0.1 cm from the inferior margin, 2.2 cm from the lateral margin, and 0.9 cm from the medial margin.  The biopsy cavity is surrounded by dense fibrous tissue with hemorrhagic discoloration and fibrocystic nodularity.  The surrounding dense fibrous tissue spans slice 1-6 and measures approximately 1.8 x 1.6 x 1.3 cm.  The remainder of the breast parenchyma is yellow and fatty with less than 10% dense fibrous tissue.  No other lesions are identified.  A photograph has been taken.  The specimen is entirely submitted in 15 cassettes.  [facility]     NOTE:  Ischemia time: 12:55 on 04/02/2025.  This specimen was placed into formalin at: 13:18 on 04/02/2025.     Summary of Cassettes:  Specimen Label                           Site  B  1 Slice 1, medial end (entire perpendicular)  2 Slice 2, dense fibrous tissue with fibrocystic nodularity (entire slice)  3 Slice 3, dense fibrous tissue with fibrocystic nodularity (entire slice)  4-5 Slice 4, biopsy cavity and dense fibrous tissue with fibrocystic nodularity (entire slice bisected)  6-7 Slice 5, biopsy cavity and dense fibrous tissue with fibrocystic nodularity (entire slice bisected)  8-9 Slice 6, remaining biopsy cavity and dense fibrous tissue with fibrocystic nodularity (entire slice bisected)  10 Slice 7, uninvolved directly lateral to biopsy cavity (entire slice)  11 Slice 8, entire  "slice  12 Slice 9, entire slice  13 Slice 10, entire slice  14 Slice 11, entire slice  15 Slice 12, lateral end (entire perpendicular)  C: Received fresh, labeled with the patient's name and hospital number and \"right breast superior shave margin\", is an irregular segment of yellow lobulated fibrofatty tissue, 2.4 x 2.0 x 1.1 cm.  The specimen is oriented with a clip on the                           new margin.  The new margin is inked red. The specimen is serially sectioned to reveal lobulated, yellow, and fatty cut surfaces with 10% dense fibrous tissue. No lesion is identified. The specimen is entirely submitted in 3 cassettes.  [facility]     NOTE:  Ischemia time: 15:59 on 04/02/2025.  This specimen was placed into formalin at: 14:10 on 04/02/2025.  D: Received fresh, labeled with the patient's name and hospital number and \"right breast lateral shave margin\", is an irregular segment of yellow lobulated fibrofatty tissue, 3.1 x 1.5 x 1.4 cm.  The specimen is oriented with a clip on the new margin.  The new margin is inked orange. The specimen is serially sectioned to reveal yellow fatty cut surfaces with 20% dense fibrous tissue. No lesion is identified. The specimen is entirely submitted in 3 cassettes.  [facility]     NOTE:  Ischemia time: 15:59 on 04/02/2025.  This specimen was placed into formalin at: 14:10 on 04/02/2025.  E: Received fresh, labeled with the patient's name and hospital                           number and \"right breast anterior shave margin\", is an irregular segment of yellow lobulated fibrofatty tissue, 2.8 x 1.7 x 1.2 cm.  The specimen is oriented with a clip on the new margin.  The new margin is inked green. The specimen is serially sectioned to reveal lobulated, yellow, and fatty cut surfaces with less than 10% fibrous tissue. No lesion is identified. The specimen is entirely submitted in 3 cassettes.  [facility]     NOTE:  Ischemia time: 15:59 on 04/02/2025.  This specimen was placed into formalin at: 14:10 " "on 04/02/2025.  F: Received fresh, labeled with the patient's name and hospital number and \"right breast posterior shave margin\", is an irregular segment of yellow lobulated fibrofatty tissue, 2.8 x 1.8 x 1.2 cm.  The specimen is oriented with a clip on the new margin.  The new margin is inked black. The specimen is serially sectioned to reveal lobulated, yellow, and fatty cut surfaces with less than 10% dense fibrous tissue. No lesion is identified. The specimen is entirely                           submitted in 3 cassettes.  [facility]     NOTE:  Ischemia time: 15:59 on 04/02/2025.  This specimen was placed into formalin at: 14:10 on 04/02/2025.  G: Received fresh, labeled with the patient's name and hospital number and \"right breast medial shave margin\", is an irregular segment of yellow lobulated fibrofatty tissue, 1.5 x 1.0 x 0.8 cm.  The specimen is oriented with a clip on the new margin.  The new margin is inked yellow. The specimen is serially sectioned to reveal lobulated, yellow, and fatty cut surfaces. No lesion is identified. The specimen is entirely submitted in 1 cassettes.  [facility]     NOTE:  Ischemia time: 15:59 on 04/02/2025.  This specimen was placed into formalin at: 14:10 on 04/02/2025.  H: Received fresh, labeled with the patient's name and hospital number and \"right breast inferior shave margin\", is an irregular segment of yellow lobulated fibrofatty tissue, 2.8 x 2.0 x 1.5 cm.  The specimen is oriented with a clip on the new margin.  The new margin is inked blue. The                           specimen is serially sectioned to reveal lobulated, yellow, and fatty cut surfaces. No lesion is identified. The specimen is entirely submitted in 3 cassettes.  [facility]     NOTE:  Ischemia time: 15:59 on 04/02/2025.  This specimen was placed into formalin at: 14:10 on 04/02/2025.  I: Received fresh, labeled with the patient's name and hospital number and \"right axillary sentinel lymph node #1\", is a possible lymph node " "with attached adipose tissue measuring 1.8 x 1.5 x 1.0 cm.  The lymph node is serially sectioned.  The specimen is entirely submitted in 1 cassette.   [facility]  J: Received fresh, labeled with the patient's name and hospital number and \"right axillary sentinel lymph node #2\", is a possible lymph node with attached adipose tissue measuring 1.5 x 1.2 x 1.1 cm.  The lymph node is serially sectioned.  The specimen is entirely submitted in 1 cassette.  [facility]    Gross dissection performed at:  [place] [address]  Phone: [phone]  Fax: [phone]       Disclaimer 04/02/2025    Final                    Value:One or more of the reagents used to perform assays on this specimen MAY have contained components considered to be analyte specific reagents (ASR's).  ASR's have not been cleared or approved by the U.S. Food and Drug Administration.  These assays were developed and their performance characteristics determined by the Department of Pathology at [facility]. The FDA does not require this test to go through premarket FDA review. This test is used for clinical purposes. It should not be regarded as investigational or for research. This laboratory is certified under the Clinical Laboratory Improvement Amendments (CLIA) as qualified to perform high complexity clinical laboratory testing.  The assays were performed with appropriate positive and negative controls which stained appropriately.      Scan Result 04/02/2025 See Scanned Result   Final                IMPRESSION/PLAN    Multifocal pT1cN0 ER/MO+ HER2- breast cancer oncotype dx of 23.  Recommend proceeding w/adjuvant XRT. Will order bloodwork to confirm menopausal level estradiol levels and baseline DEXA. Patient is open to taking anastrozole post-XRT. I will see her back in about 6-8 wks.        We discussed the clinical significance of " diagnosis, goals of care and treatment plan in detail.     I personally spent over half of a total 35 minutes face to face with the patient in counseling and discussion and/or coordination of care as described above.         -------------------------------------------------------------------------------------------------------------------------------  Nicho King MD  Director of Breast Cancer Medical Oncology Research Program   [facility]  Professor of Medicine  [address]  [place] [id number]  Phone: [phone]  [email or web address]             [1]   Family History  Problem Relation Name Age of Onset    Epilepsy Mother      Other (bladder cancer) Father Chago Patel     Diabetes Father Chago Patel     Pancreatic cancer Father's Sister      Pancreatic cancer Paternal Grandfather      Ovarian cancer Neg Hx      Breast cancer Neg Hx

## 2025-06-06 ENCOUNTER — HOSPITAL ENCOUNTER (OUTPATIENT)
Dept: RADIOLOGY | Facility: HOSPITAL | Age: 55
Discharge: HOME | End: 2025-06-06
Payer: COMMERCIAL

## 2025-06-06 DIAGNOSIS — C50.411 MALIGNANT NEOPLASM OF UPPER-OUTER QUADRANT OF RIGHT BREAST IN FEMALE, ESTROGEN RECEPTOR POSITIVE: ICD-10-CM

## 2025-06-06 DIAGNOSIS — Z78.0 POSTMENOPAUSAL: ICD-10-CM

## 2025-06-06 DIAGNOSIS — Z17.0 MALIGNANT NEOPLASM OF UPPER-OUTER QUADRANT OF RIGHT BREAST IN FEMALE, ESTROGEN RECEPTOR POSITIVE: ICD-10-CM

## 2025-06-06 PROCEDURE — 77080 DXA BONE DENSITY AXIAL: CPT | Performed by: RADIOLOGY

## 2025-06-06 PROCEDURE — 77080 DXA BONE DENSITY AXIAL: CPT

## 2025-06-12 ENCOUNTER — APPOINTMENT (OUTPATIENT)
Dept: RADIATION ONCOLOGY | Facility: CLINIC | Age: 55
End: 2025-06-12
Payer: COMMERCIAL

## 2025-06-13 ENCOUNTER — TELEPHONE (OUTPATIENT)
Dept: RADIATION ONCOLOGY | Facility: CLINIC | Age: 55
End: 2025-06-13
Payer: COMMERCIAL

## 2025-06-16 ENCOUNTER — HOSPITAL ENCOUNTER (OUTPATIENT)
Dept: RADIOLOGY | Facility: EXTERNAL LOCATION | Age: 55
Discharge: HOME | End: 2025-06-16

## 2025-06-16 ENCOUNTER — HOSPITAL ENCOUNTER (OUTPATIENT)
Dept: RADIATION ONCOLOGY | Facility: CLINIC | Age: 55
Setting detail: RADIATION/ONCOLOGY SERIES
Discharge: HOME | End: 2025-06-16
Payer: COMMERCIAL

## 2025-06-16 VITALS
OXYGEN SATURATION: 96 % | WEIGHT: 196.87 LBS | SYSTOLIC BLOOD PRESSURE: 150 MMHG | BODY MASS INDEX: 30.12 KG/M2 | DIASTOLIC BLOOD PRESSURE: 86 MMHG | HEART RATE: 87 BPM | TEMPERATURE: 98.1 F | RESPIRATION RATE: 18 BRPM

## 2025-06-16 DIAGNOSIS — C50.911 INFILTRATING DUCT AND LOBULAR CARCINOMA OF RIGHT BREAST: ICD-10-CM

## 2025-06-16 PROCEDURE — 77290 THER RAD SIMULAJ FIELD CPLX: CPT | Performed by: STUDENT IN AN ORGANIZED HEALTH CARE EDUCATION/TRAINING PROGRAM

## 2025-06-16 PROCEDURE — 77334 RADIATION TREATMENT AID(S): CPT | Performed by: STUDENT IN AN ORGANIZED HEALTH CARE EDUCATION/TRAINING PROGRAM

## 2025-06-16 ASSESSMENT — PAIN SCALES - GENERAL: PAINLEVEL_OUTOF10: 0-NO PAIN

## 2025-06-27 ENCOUNTER — HOSPITAL ENCOUNTER (OUTPATIENT)
Dept: RADIATION ONCOLOGY | Facility: CLINIC | Age: 55
Setting detail: RADIATION/ONCOLOGY SERIES
Discharge: HOME | End: 2025-06-27
Payer: COMMERCIAL

## 2025-06-27 PROCEDURE — 77334 RADIATION TREATMENT AID(S): CPT | Performed by: STUDENT IN AN ORGANIZED HEALTH CARE EDUCATION/TRAINING PROGRAM

## 2025-06-27 PROCEDURE — 77295 3-D RADIOTHERAPY PLAN: CPT | Performed by: STUDENT IN AN ORGANIZED HEALTH CARE EDUCATION/TRAINING PROGRAM

## 2025-06-27 PROCEDURE — 77300 RADIATION THERAPY DOSE PLAN: CPT | Performed by: STUDENT IN AN ORGANIZED HEALTH CARE EDUCATION/TRAINING PROGRAM

## 2025-06-30 ENCOUNTER — HOSPITAL ENCOUNTER (OUTPATIENT)
Dept: RADIATION ONCOLOGY | Facility: CLINIC | Age: 55
Setting detail: RADIATION/ONCOLOGY SERIES
Discharge: HOME | End: 2025-06-30
Payer: COMMERCIAL

## 2025-06-30 DIAGNOSIS — C50.411 MALIGNANT NEOPLASM OF UPPER-OUTER QUADRANT OF RIGHT FEMALE BREAST: ICD-10-CM

## 2025-06-30 DIAGNOSIS — Z51.0 ENCOUNTER FOR ANTINEOPLASTIC RADIATION THERAPY: ICD-10-CM

## 2025-06-30 LAB
RAD ONC MSQ ACTUAL FRACTIONS DELIVERED: 1
RAD ONC MSQ ACTUAL SESSION DELIVERED DOSE: 267 CGRAY
RAD ONC MSQ ACTUAL TOTAL DOSE: 267 CGRAY
RAD ONC MSQ ELAPSED DAYS: 0
RAD ONC MSQ LAST DATE: NORMAL
RAD ONC MSQ PRESCRIBED FRACTIONAL DOSE: 267 CGRAY
RAD ONC MSQ PRESCRIBED NUMBER OF FRACTIONS: 15
RAD ONC MSQ PRESCRIBED TECHNIQUE: NORMAL
RAD ONC MSQ PRESCRIBED TOTAL DOSE: 4005 CGRAY
RAD ONC MSQ START DATE: NORMAL
RAD ONC MSQ TREATMENT COURSE NUMBER: 1
RAD ONC MSQ TREATMENT SITE: NORMAL

## 2025-06-30 PROCEDURE — 77280 THER RAD SIMULAJ FIELD SMPL: CPT | Performed by: STUDENT IN AN ORGANIZED HEALTH CARE EDUCATION/TRAINING PROGRAM

## 2025-06-30 PROCEDURE — 77412 RADIATION TX DELIVERY LVL 3: CPT | Performed by: STUDENT IN AN ORGANIZED HEALTH CARE EDUCATION/TRAINING PROGRAM

## 2025-07-01 ENCOUNTER — HOSPITAL ENCOUNTER (OUTPATIENT)
Dept: RADIATION ONCOLOGY | Facility: CLINIC | Age: 55
Setting detail: RADIATION/ONCOLOGY SERIES
Discharge: HOME | End: 2025-07-01
Payer: COMMERCIAL

## 2025-07-01 DIAGNOSIS — C50.411 MALIGNANT NEOPLASM OF UPPER-OUTER QUADRANT OF RIGHT FEMALE BREAST: ICD-10-CM

## 2025-07-01 DIAGNOSIS — Z51.0 ENCOUNTER FOR ANTINEOPLASTIC RADIATION THERAPY: ICD-10-CM

## 2025-07-01 LAB
RAD ONC MSQ ACTUAL FRACTIONS DELIVERED: 2
RAD ONC MSQ ACTUAL SESSION DELIVERED DOSE: 267 CGRAY
RAD ONC MSQ ACTUAL TOTAL DOSE: 534 CGRAY
RAD ONC MSQ ELAPSED DAYS: 1
RAD ONC MSQ LAST DATE: NORMAL
RAD ONC MSQ PRESCRIBED FRACTIONAL DOSE: 267 CGRAY
RAD ONC MSQ PRESCRIBED NUMBER OF FRACTIONS: 15
RAD ONC MSQ PRESCRIBED TECHNIQUE: NORMAL
RAD ONC MSQ PRESCRIBED TOTAL DOSE: 4005 CGRAY
RAD ONC MSQ START DATE: NORMAL
RAD ONC MSQ TREATMENT COURSE NUMBER: 1
RAD ONC MSQ TREATMENT SITE: NORMAL

## 2025-07-01 PROCEDURE — 77417 THER RADIOLOGY PORT IMAGE(S): CPT | Performed by: STUDENT IN AN ORGANIZED HEALTH CARE EDUCATION/TRAINING PROGRAM

## 2025-07-01 PROCEDURE — 77336 RADIATION PHYSICS CONSULT: CPT | Performed by: STUDENT IN AN ORGANIZED HEALTH CARE EDUCATION/TRAINING PROGRAM

## 2025-07-01 PROCEDURE — 77412 RADIATION TX DELIVERY LVL 3: CPT | Performed by: STUDENT IN AN ORGANIZED HEALTH CARE EDUCATION/TRAINING PROGRAM

## 2025-07-02 ENCOUNTER — HOSPITAL ENCOUNTER (OUTPATIENT)
Dept: RADIATION ONCOLOGY | Facility: CLINIC | Age: 55
Setting detail: RADIATION/ONCOLOGY SERIES
Discharge: HOME | End: 2025-07-02
Payer: COMMERCIAL

## 2025-07-02 DIAGNOSIS — C50.411 MALIGNANT NEOPLASM OF UPPER-OUTER QUADRANT OF RIGHT FEMALE BREAST: ICD-10-CM

## 2025-07-02 DIAGNOSIS — Z51.0 ENCOUNTER FOR ANTINEOPLASTIC RADIATION THERAPY: ICD-10-CM

## 2025-07-02 LAB
RAD ONC MSQ ACTUAL FRACTIONS DELIVERED: 3
RAD ONC MSQ ACTUAL SESSION DELIVERED DOSE: 267 CGRAY
RAD ONC MSQ ACTUAL TOTAL DOSE: 801 CGRAY
RAD ONC MSQ ELAPSED DAYS: 2
RAD ONC MSQ LAST DATE: NORMAL
RAD ONC MSQ PRESCRIBED FRACTIONAL DOSE: 267 CGRAY
RAD ONC MSQ PRESCRIBED NUMBER OF FRACTIONS: 15
RAD ONC MSQ PRESCRIBED TECHNIQUE: NORMAL
RAD ONC MSQ PRESCRIBED TOTAL DOSE: 4005 CGRAY
RAD ONC MSQ START DATE: NORMAL
RAD ONC MSQ TREATMENT COURSE NUMBER: 1
RAD ONC MSQ TREATMENT SITE: NORMAL

## 2025-07-02 PROCEDURE — 77412 RADIATION TX DELIVERY LVL 3: CPT | Performed by: STUDENT IN AN ORGANIZED HEALTH CARE EDUCATION/TRAINING PROGRAM

## 2025-07-03 ENCOUNTER — HOSPITAL ENCOUNTER (OUTPATIENT)
Dept: RADIATION ONCOLOGY | Facility: CLINIC | Age: 55
Setting detail: RADIATION/ONCOLOGY SERIES
Discharge: HOME | End: 2025-07-03
Payer: COMMERCIAL

## 2025-07-03 VITALS
DIASTOLIC BLOOD PRESSURE: 85 MMHG | OXYGEN SATURATION: 97 % | WEIGHT: 195.77 LBS | SYSTOLIC BLOOD PRESSURE: 147 MMHG | BODY MASS INDEX: 29.95 KG/M2 | HEART RATE: 83 BPM | RESPIRATION RATE: 18 BRPM | TEMPERATURE: 96.3 F

## 2025-07-03 DIAGNOSIS — Z51.0 ENCOUNTER FOR ANTINEOPLASTIC RADIATION THERAPY: ICD-10-CM

## 2025-07-03 DIAGNOSIS — C50.411 MALIGNANT NEOPLASM OF UPPER-OUTER QUADRANT OF RIGHT FEMALE BREAST: ICD-10-CM

## 2025-07-03 LAB
RAD ONC MSQ ACTUAL FRACTIONS DELIVERED: 4
RAD ONC MSQ ACTUAL SESSION DELIVERED DOSE: 267 CGRAY
RAD ONC MSQ ACTUAL TOTAL DOSE: 1068 CGRAY
RAD ONC MSQ ELAPSED DAYS: 3
RAD ONC MSQ LAST DATE: NORMAL
RAD ONC MSQ PRESCRIBED FRACTIONAL DOSE: 267 CGRAY
RAD ONC MSQ PRESCRIBED NUMBER OF FRACTIONS: 15
RAD ONC MSQ PRESCRIBED TECHNIQUE: NORMAL
RAD ONC MSQ PRESCRIBED TOTAL DOSE: 4005 CGRAY
RAD ONC MSQ START DATE: NORMAL
RAD ONC MSQ TREATMENT COURSE NUMBER: 1
RAD ONC MSQ TREATMENT SITE: NORMAL

## 2025-07-03 PROCEDURE — 77412 RADIATION TX DELIVERY LVL 3: CPT | Performed by: STUDENT IN AN ORGANIZED HEALTH CARE EDUCATION/TRAINING PROGRAM

## 2025-07-03 ASSESSMENT — PAIN SCALES - GENERAL: PAINLEVEL_OUTOF10: 0-NO PAIN

## 2025-07-03 ASSESSMENT — ENCOUNTER SYMPTOMS
LOSS OF SENSATION IN FEET: 0
DEPRESSION: 0
OCCASIONAL FEELINGS OF UNSTEADINESS: 0

## 2025-07-03 NOTE — PROGRESS NOTES
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 No appreciable skin changes. Denies any pain or discomfort. Has not started to use skin cream, but discussed using today.      OBJECTIVE:   Vital Signs:  /85   Pulse 83   Temp 35.7 °C (96.3 °F) (Temporal)   Resp 18   Wt 88.8 kg (195 lb 12.3 oz)   SpO2 97%   BMI 29.95 kg/m²     Other Pertinent Findings:     Toxicity Assessment          7/3/2025    10:06   Toxicity Assessment   Treatment Site Breast   Anorexia Grade 0   Anxiety Grade 0   Dehydration Grade 0   Depression Grade 0   Dermatitis Radiation Grade 0   Diarrhea Grade 0   Fatigue Grade 1   Nausea Grade 0   Pain Grade 0   Vomiting Grade 0   Joint Range of Motion Decreased Grade 0   Joint Range of Motion Decreased Lumbar Spine Grade 0   Breast Atrophy Grade 0   Breast Pain Grade 0   Nipple Deformity Grade 0   Pneumonitis Grade 0   Edema Limbs Grade 0   Hot Flashes Grade 0   Lymphedema Grade 0        Assessment / Plan:  The patient is tolerating radiation therapy as anticipated.  Continue per current treatment plan.

## 2025-07-07 ENCOUNTER — HOSPITAL ENCOUNTER (OUTPATIENT)
Dept: RADIATION ONCOLOGY | Facility: CLINIC | Age: 55
Setting detail: RADIATION/ONCOLOGY SERIES
Discharge: HOME | End: 2025-07-07
Payer: COMMERCIAL

## 2025-07-07 DIAGNOSIS — C50.411 MALIGNANT NEOPLASM OF UPPER-OUTER QUADRANT OF RIGHT FEMALE BREAST: ICD-10-CM

## 2025-07-07 DIAGNOSIS — Z51.0 ENCOUNTER FOR ANTINEOPLASTIC RADIATION THERAPY: ICD-10-CM

## 2025-07-07 LAB
RAD ONC MSQ ACTUAL FRACTIONS DELIVERED: 5
RAD ONC MSQ ACTUAL SESSION DELIVERED DOSE: 267 CGRAY
RAD ONC MSQ ACTUAL TOTAL DOSE: 1335 CGRAY
RAD ONC MSQ ELAPSED DAYS: 7
RAD ONC MSQ LAST DATE: NORMAL
RAD ONC MSQ PRESCRIBED FRACTIONAL DOSE: 267 CGRAY
RAD ONC MSQ PRESCRIBED NUMBER OF FRACTIONS: 15
RAD ONC MSQ PRESCRIBED TECHNIQUE: NORMAL
RAD ONC MSQ PRESCRIBED TOTAL DOSE: 4005 CGRAY
RAD ONC MSQ START DATE: NORMAL
RAD ONC MSQ TREATMENT COURSE NUMBER: 1
RAD ONC MSQ TREATMENT SITE: NORMAL

## 2025-07-07 PROCEDURE — 77412 RADIATION TX DELIVERY LVL 3: CPT | Performed by: STUDENT IN AN ORGANIZED HEALTH CARE EDUCATION/TRAINING PROGRAM

## 2025-07-08 ENCOUNTER — HOSPITAL ENCOUNTER (OUTPATIENT)
Dept: RADIATION ONCOLOGY | Facility: CLINIC | Age: 55
Setting detail: RADIATION/ONCOLOGY SERIES
Discharge: HOME | End: 2025-07-08
Payer: COMMERCIAL

## 2025-07-08 DIAGNOSIS — C50.411 MALIGNANT NEOPLASM OF UPPER-OUTER QUADRANT OF RIGHT FEMALE BREAST: ICD-10-CM

## 2025-07-08 DIAGNOSIS — Z51.0 ENCOUNTER FOR ANTINEOPLASTIC RADIATION THERAPY: ICD-10-CM

## 2025-07-08 LAB
RAD ONC MSQ ACTUAL FRACTIONS DELIVERED: 6
RAD ONC MSQ ACTUAL SESSION DELIVERED DOSE: 267 CGRAY
RAD ONC MSQ ACTUAL TOTAL DOSE: 1602 CGRAY
RAD ONC MSQ ELAPSED DAYS: 8
RAD ONC MSQ LAST DATE: NORMAL
RAD ONC MSQ PRESCRIBED FRACTIONAL DOSE: 267 CGRAY
RAD ONC MSQ PRESCRIBED NUMBER OF FRACTIONS: 15
RAD ONC MSQ PRESCRIBED TECHNIQUE: NORMAL
RAD ONC MSQ PRESCRIBED TOTAL DOSE: 4005 CGRAY
RAD ONC MSQ START DATE: NORMAL
RAD ONC MSQ TREATMENT COURSE NUMBER: 1
RAD ONC MSQ TREATMENT SITE: NORMAL

## 2025-07-08 PROCEDURE — 77412 RADIATION TX DELIVERY LVL 3: CPT | Performed by: STUDENT IN AN ORGANIZED HEALTH CARE EDUCATION/TRAINING PROGRAM

## 2025-07-08 PROCEDURE — 77336 RADIATION PHYSICS CONSULT: CPT | Performed by: STUDENT IN AN ORGANIZED HEALTH CARE EDUCATION/TRAINING PROGRAM

## 2025-07-08 PROCEDURE — 77417 THER RADIOLOGY PORT IMAGE(S): CPT | Performed by: STUDENT IN AN ORGANIZED HEALTH CARE EDUCATION/TRAINING PROGRAM

## 2025-07-09 ENCOUNTER — HOSPITAL ENCOUNTER (OUTPATIENT)
Dept: RADIATION ONCOLOGY | Facility: CLINIC | Age: 55
Setting detail: RADIATION/ONCOLOGY SERIES
Discharge: HOME | End: 2025-07-09
Payer: COMMERCIAL

## 2025-07-09 DIAGNOSIS — Z51.0 ENCOUNTER FOR ANTINEOPLASTIC RADIATION THERAPY: ICD-10-CM

## 2025-07-09 DIAGNOSIS — C50.411 MALIGNANT NEOPLASM OF UPPER-OUTER QUADRANT OF RIGHT FEMALE BREAST: ICD-10-CM

## 2025-07-09 LAB
RAD ONC MSQ ACTUAL FRACTIONS DELIVERED: 7
RAD ONC MSQ ACTUAL SESSION DELIVERED DOSE: 267 CGRAY
RAD ONC MSQ ACTUAL TOTAL DOSE: 1869 CGRAY
RAD ONC MSQ ELAPSED DAYS: 9
RAD ONC MSQ LAST DATE: NORMAL
RAD ONC MSQ PRESCRIBED FRACTIONAL DOSE: 267 CGRAY
RAD ONC MSQ PRESCRIBED NUMBER OF FRACTIONS: 15
RAD ONC MSQ PRESCRIBED TECHNIQUE: NORMAL
RAD ONC MSQ PRESCRIBED TOTAL DOSE: 4005 CGRAY
RAD ONC MSQ START DATE: NORMAL
RAD ONC MSQ TREATMENT COURSE NUMBER: 1
RAD ONC MSQ TREATMENT SITE: NORMAL

## 2025-07-09 PROCEDURE — 77412 RADIATION TX DELIVERY LVL 3: CPT | Performed by: STUDENT IN AN ORGANIZED HEALTH CARE EDUCATION/TRAINING PROGRAM

## 2025-07-10 ENCOUNTER — HOSPITAL ENCOUNTER (OUTPATIENT)
Dept: RADIATION ONCOLOGY | Facility: CLINIC | Age: 55
Setting detail: RADIATION/ONCOLOGY SERIES
Discharge: HOME | End: 2025-07-10
Payer: COMMERCIAL

## 2025-07-10 VITALS
OXYGEN SATURATION: 96 % | TEMPERATURE: 98.6 F | BODY MASS INDEX: 30.52 KG/M2 | DIASTOLIC BLOOD PRESSURE: 91 MMHG | HEART RATE: 78 BPM | RESPIRATION RATE: 18 BRPM | SYSTOLIC BLOOD PRESSURE: 148 MMHG | WEIGHT: 199.52 LBS

## 2025-07-10 DIAGNOSIS — Z51.0 ENCOUNTER FOR ANTINEOPLASTIC RADIATION THERAPY: ICD-10-CM

## 2025-07-10 DIAGNOSIS — C50.411 MALIGNANT NEOPLASM OF UPPER-OUTER QUADRANT OF RIGHT FEMALE BREAST: ICD-10-CM

## 2025-07-10 LAB
RAD ONC MSQ ACTUAL FRACTIONS DELIVERED: 8
RAD ONC MSQ ACTUAL SESSION DELIVERED DOSE: 267 CGRAY
RAD ONC MSQ ACTUAL TOTAL DOSE: 2136 CGRAY
RAD ONC MSQ ELAPSED DAYS: 10
RAD ONC MSQ LAST DATE: NORMAL
RAD ONC MSQ PRESCRIBED FRACTIONAL DOSE: 267 CGRAY
RAD ONC MSQ PRESCRIBED NUMBER OF FRACTIONS: 15
RAD ONC MSQ PRESCRIBED TECHNIQUE: NORMAL
RAD ONC MSQ PRESCRIBED TOTAL DOSE: 4005 CGRAY
RAD ONC MSQ START DATE: NORMAL
RAD ONC MSQ TREATMENT COURSE NUMBER: 1
RAD ONC MSQ TREATMENT SITE: NORMAL

## 2025-07-10 PROCEDURE — 77412 RADIATION TX DELIVERY LVL 3: CPT | Performed by: STUDENT IN AN ORGANIZED HEALTH CARE EDUCATION/TRAINING PROGRAM

## 2025-07-10 ASSESSMENT — PAIN SCALES - GENERAL: PAINLEVEL_OUTOF10: 0-NO PAIN

## 2025-07-10 NOTE — PROGRESS NOTES
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 Faint erythema and occasional mild pruritus.      OBJECTIVE:   Vital Signs:  BP (!) 148/91   Pulse 78   Temp 37 °C (98.6 °F) (Skin)   Resp 18   Wt 90.5 kg (199 lb 8.3 oz)   SpO2 96%   BMI 30.52 kg/m²     Other Pertinent Findings:     Toxicity Assessment          7/3/2025    10:06 7/10/2025    10:00   Toxicity Assessment   Treatment Site Breast Breast   Anorexia Grade 0 Grade 0   Anxiety Grade 0 Grade 0   Dehydration Grade 0 Grade 0   Depression Grade 0 Grade 0   Dermatitis Radiation Grade 0 Grade 1       slightly red, using udderly smooth   Diarrhea Grade 0 Grade 0   Fatigue Grade 1 Grade 1   Nausea Grade 0 Grade 0   Pain Grade 0 Grade 0   Vomiting Grade 0 Grade 0   Joint Range of Motion Decreased Grade 0 Grade 0   Joint Range of Motion Decreased Lumbar Spine Grade 0 Grade 0   Breast Atrophy Grade 0 Grade 0   Breast Pain Grade 0 Grade 0   Nipple Deformity Grade 0 Grade 0   Pneumonitis Grade 0 Grade 0   Edema Limbs Grade 0 Grade 0   Hot Flashes Grade 0 Grade 0   Lymphedema Grade 0 Grade 0        Assessment /  Plan:  The patient is tolerating radiation therapy as anticipated.  Continue per current treatment plan.

## 2025-07-11 ENCOUNTER — HOSPITAL ENCOUNTER (OUTPATIENT)
Dept: RADIATION ONCOLOGY | Facility: CLINIC | Age: 55
Setting detail: RADIATION/ONCOLOGY SERIES
Discharge: HOME | End: 2025-07-11
Payer: COMMERCIAL

## 2025-07-11 DIAGNOSIS — C50.411 MALIGNANT NEOPLASM OF UPPER-OUTER QUADRANT OF RIGHT FEMALE BREAST: ICD-10-CM

## 2025-07-11 DIAGNOSIS — Z51.0 ENCOUNTER FOR ANTINEOPLASTIC RADIATION THERAPY: ICD-10-CM

## 2025-07-11 LAB
RAD ONC MSQ ACTUAL FRACTIONS DELIVERED: 9
RAD ONC MSQ ACTUAL SESSION DELIVERED DOSE: 267 CGRAY
RAD ONC MSQ ACTUAL TOTAL DOSE: 2403 CGRAY
RAD ONC MSQ ELAPSED DAYS: 11
RAD ONC MSQ LAST DATE: NORMAL
RAD ONC MSQ PRESCRIBED FRACTIONAL DOSE: 267 CGRAY
RAD ONC MSQ PRESCRIBED NUMBER OF FRACTIONS: 15
RAD ONC MSQ PRESCRIBED TECHNIQUE: NORMAL
RAD ONC MSQ PRESCRIBED TOTAL DOSE: 4005 CGRAY
RAD ONC MSQ START DATE: NORMAL
RAD ONC MSQ TREATMENT COURSE NUMBER: 1
RAD ONC MSQ TREATMENT SITE: NORMAL

## 2025-07-11 PROCEDURE — 77412 RADIATION TX DELIVERY LVL 3: CPT | Performed by: STUDENT IN AN ORGANIZED HEALTH CARE EDUCATION/TRAINING PROGRAM

## 2025-07-14 ENCOUNTER — HOSPITAL ENCOUNTER (OUTPATIENT)
Dept: RADIATION ONCOLOGY | Facility: CLINIC | Age: 55
Setting detail: RADIATION/ONCOLOGY SERIES
Discharge: HOME | End: 2025-07-14
Payer: COMMERCIAL

## 2025-07-14 DIAGNOSIS — Z51.0 ENCOUNTER FOR ANTINEOPLASTIC RADIATION THERAPY: ICD-10-CM

## 2025-07-14 DIAGNOSIS — C50.411 MALIGNANT NEOPLASM OF UPPER-OUTER QUADRANT OF RIGHT FEMALE BREAST: ICD-10-CM

## 2025-07-14 LAB
RAD ONC MSQ ACTUAL FRACTIONS DELIVERED: 10
RAD ONC MSQ ACTUAL SESSION DELIVERED DOSE: 267 CGRAY
RAD ONC MSQ ACTUAL TOTAL DOSE: 2670 CGRAY
RAD ONC MSQ ELAPSED DAYS: 14
RAD ONC MSQ LAST DATE: NORMAL
RAD ONC MSQ PRESCRIBED FRACTIONAL DOSE: 267 CGRAY
RAD ONC MSQ PRESCRIBED NUMBER OF FRACTIONS: 15
RAD ONC MSQ PRESCRIBED TECHNIQUE: NORMAL
RAD ONC MSQ PRESCRIBED TOTAL DOSE: 4005 CGRAY
RAD ONC MSQ START DATE: NORMAL
RAD ONC MSQ TREATMENT COURSE NUMBER: 1
RAD ONC MSQ TREATMENT SITE: NORMAL

## 2025-07-14 PROCEDURE — 77412 RADIATION TX DELIVERY LVL 3: CPT | Performed by: STUDENT IN AN ORGANIZED HEALTH CARE EDUCATION/TRAINING PROGRAM

## 2025-07-15 ENCOUNTER — HOSPITAL ENCOUNTER (OUTPATIENT)
Dept: RADIATION ONCOLOGY | Facility: CLINIC | Age: 55
Setting detail: RADIATION/ONCOLOGY SERIES
Discharge: HOME | End: 2025-07-15
Payer: COMMERCIAL

## 2025-07-15 DIAGNOSIS — C50.411 MALIGNANT NEOPLASM OF UPPER-OUTER QUADRANT OF RIGHT FEMALE BREAST: ICD-10-CM

## 2025-07-15 DIAGNOSIS — Z51.0 ENCOUNTER FOR ANTINEOPLASTIC RADIATION THERAPY: ICD-10-CM

## 2025-07-15 LAB
RAD ONC MSQ ACTUAL FRACTIONS DELIVERED: 11
RAD ONC MSQ ACTUAL SESSION DELIVERED DOSE: 267 CGRAY
RAD ONC MSQ ACTUAL TOTAL DOSE: 2937 CGRAY
RAD ONC MSQ ELAPSED DAYS: 15
RAD ONC MSQ LAST DATE: NORMAL
RAD ONC MSQ PRESCRIBED FRACTIONAL DOSE: 267 CGRAY
RAD ONC MSQ PRESCRIBED NUMBER OF FRACTIONS: 15
RAD ONC MSQ PRESCRIBED TECHNIQUE: NORMAL
RAD ONC MSQ PRESCRIBED TOTAL DOSE: 4005 CGRAY
RAD ONC MSQ START DATE: NORMAL
RAD ONC MSQ TREATMENT COURSE NUMBER: 1
RAD ONC MSQ TREATMENT SITE: NORMAL

## 2025-07-15 PROCEDURE — 77412 RADIATION TX DELIVERY LVL 3: CPT | Performed by: STUDENT IN AN ORGANIZED HEALTH CARE EDUCATION/TRAINING PROGRAM

## 2025-07-15 PROCEDURE — 77417 THER RADIOLOGY PORT IMAGE(S): CPT | Performed by: STUDENT IN AN ORGANIZED HEALTH CARE EDUCATION/TRAINING PROGRAM

## 2025-07-15 PROCEDURE — 77336 RADIATION PHYSICS CONSULT: CPT | Performed by: STUDENT IN AN ORGANIZED HEALTH CARE EDUCATION/TRAINING PROGRAM

## 2025-07-16 ENCOUNTER — HOSPITAL ENCOUNTER (OUTPATIENT)
Dept: RADIATION ONCOLOGY | Facility: CLINIC | Age: 55
Setting detail: RADIATION/ONCOLOGY SERIES
Discharge: HOME | End: 2025-07-16
Payer: COMMERCIAL

## 2025-07-16 ENCOUNTER — OFFICE VISIT (OUTPATIENT)
Dept: HEMATOLOGY/ONCOLOGY | Facility: CLINIC | Age: 55
End: 2025-07-16
Payer: COMMERCIAL

## 2025-07-16 VITALS
BODY MASS INDEX: 30.45 KG/M2 | SYSTOLIC BLOOD PRESSURE: 145 MMHG | HEART RATE: 68 BPM | OXYGEN SATURATION: 95 % | TEMPERATURE: 97 F | WEIGHT: 199.08 LBS | DIASTOLIC BLOOD PRESSURE: 91 MMHG | RESPIRATION RATE: 18 BRPM

## 2025-07-16 DIAGNOSIS — Z78.0 POSTMENOPAUSAL: ICD-10-CM

## 2025-07-16 DIAGNOSIS — Z17.0 MALIGNANT NEOPLASM OF UPPER-OUTER QUADRANT OF RIGHT BREAST IN FEMALE, ESTROGEN RECEPTOR POSITIVE: Primary | ICD-10-CM

## 2025-07-16 DIAGNOSIS — C50.411 MALIGNANT NEOPLASM OF UPPER-OUTER QUADRANT OF RIGHT BREAST IN FEMALE, ESTROGEN RECEPTOR POSITIVE: Primary | ICD-10-CM

## 2025-07-16 DIAGNOSIS — Z51.0 ENCOUNTER FOR ANTINEOPLASTIC RADIATION THERAPY: ICD-10-CM

## 2025-07-16 DIAGNOSIS — C50.411 MALIGNANT NEOPLASM OF UPPER-OUTER QUADRANT OF RIGHT FEMALE BREAST: ICD-10-CM

## 2025-07-16 LAB
ALBUMIN SERPL BCP-MCNC: 4.1 G/DL (ref 3.4–5)
ALP SERPL-CCNC: 96 U/L (ref 33–110)
ALT SERPL W P-5'-P-CCNC: 14 U/L (ref 7–45)
ANION GAP SERPL CALC-SCNC: 11 MMOL/L (ref 10–20)
AST SERPL W P-5'-P-CCNC: 13 U/L (ref 9–39)
BASOPHILS # BLD AUTO: 0.02 X10*3/UL (ref 0–0.1)
BASOPHILS NFR BLD AUTO: 0.3 %
BILIRUB SERPL-MCNC: 0.4 MG/DL (ref 0–1.2)
BUN SERPL-MCNC: 20 MG/DL (ref 6–23)
CALCIUM SERPL-MCNC: 9.2 MG/DL (ref 8.6–10.3)
CHLORIDE SERPL-SCNC: 107 MMOL/L (ref 98–107)
CO2 SERPL-SCNC: 29 MMOL/L (ref 21–32)
CREAT SERPL-MCNC: 0.6 MG/DL (ref 0.5–1.05)
EGFRCR SERPLBLD CKD-EPI 2021: >90 ML/MIN/1.73M*2
EOSINOPHIL # BLD AUTO: 0.19 X10*3/UL (ref 0–0.7)
EOSINOPHIL NFR BLD AUTO: 3.2 %
ERYTHROCYTE [DISTWIDTH] IN BLOOD BY AUTOMATED COUNT: 12.8 % (ref 11.5–14.5)
ESTRADIOL SERPL-MCNC: <19 PG/ML
FSH SERPL-ACNC: 82.7 IU/L
GLUCOSE SERPL-MCNC: 117 MG/DL (ref 74–99)
HCT VFR BLD AUTO: 41.8 % (ref 36–46)
HGB BLD-MCNC: 14.3 G/DL (ref 12–16)
IMM GRANULOCYTES # BLD AUTO: 0.01 X10*3/UL (ref 0–0.7)
IMM GRANULOCYTES NFR BLD AUTO: 0.2 % (ref 0–0.9)
LH SERPL-ACNC: 48.4 IU/L
LYMPHOCYTES # BLD AUTO: 0.97 X10*3/UL (ref 1.2–4.8)
LYMPHOCYTES NFR BLD AUTO: 16.5 %
MCH RBC QN AUTO: 30.8 PG (ref 26–34)
MCHC RBC AUTO-ENTMCNC: 34.2 G/DL (ref 32–36)
MCV RBC AUTO: 90 FL (ref 80–100)
MONOCYTES # BLD AUTO: 0.38 X10*3/UL (ref 0.1–1)
MONOCYTES NFR BLD AUTO: 6.5 %
NEUTROPHILS # BLD AUTO: 4.3 X10*3/UL (ref 1.2–7.7)
NEUTROPHILS NFR BLD AUTO: 73.3 %
NRBC BLD-RTO: 0 /100 WBCS (ref 0–0)
PLATELET # BLD AUTO: 208 X10*3/UL (ref 150–450)
POTASSIUM SERPL-SCNC: 3.8 MMOL/L (ref 3.5–5.3)
PROT SERPL-MCNC: 6.5 G/DL (ref 6.4–8.2)
RAD ONC MSQ ACTUAL FRACTIONS DELIVERED: 12
RAD ONC MSQ ACTUAL SESSION DELIVERED DOSE: 267 CGRAY
RAD ONC MSQ ACTUAL TOTAL DOSE: 3204 CGRAY
RAD ONC MSQ ELAPSED DAYS: 16
RAD ONC MSQ LAST DATE: NORMAL
RAD ONC MSQ PRESCRIBED FRACTIONAL DOSE: 267 CGRAY
RAD ONC MSQ PRESCRIBED NUMBER OF FRACTIONS: 15
RAD ONC MSQ PRESCRIBED TECHNIQUE: NORMAL
RAD ONC MSQ PRESCRIBED TOTAL DOSE: 4005 CGRAY
RAD ONC MSQ START DATE: NORMAL
RAD ONC MSQ TREATMENT COURSE NUMBER: 1
RAD ONC MSQ TREATMENT SITE: NORMAL
RBC # BLD AUTO: 4.65 X10*6/UL (ref 4–5.2)
SODIUM SERPL-SCNC: 143 MMOL/L (ref 136–145)
WBC # BLD AUTO: 5.9 X10*3/UL (ref 4.4–11.3)

## 2025-07-16 PROCEDURE — 99215 OFFICE O/P EST HI 40 MIN: CPT | Performed by: INTERNAL MEDICINE

## 2025-07-16 PROCEDURE — 83001 ASSAY OF GONADOTROPIN (FSH): CPT | Performed by: INTERNAL MEDICINE

## 2025-07-16 PROCEDURE — 80053 COMPREHEN METABOLIC PANEL: CPT | Performed by: INTERNAL MEDICINE

## 2025-07-16 PROCEDURE — 82670 ASSAY OF TOTAL ESTRADIOL: CPT | Performed by: INTERNAL MEDICINE

## 2025-07-16 PROCEDURE — 77412 RADIATION TX DELIVERY LVL 3: CPT | Performed by: STUDENT IN AN ORGANIZED HEALTH CARE EDUCATION/TRAINING PROGRAM

## 2025-07-16 PROCEDURE — 3077F SYST BP >= 140 MM HG: CPT | Performed by: INTERNAL MEDICINE

## 2025-07-16 PROCEDURE — 83520 IMMUNOASSAY QUANT NOS NONAB: CPT | Performed by: INTERNAL MEDICINE

## 2025-07-16 PROCEDURE — 85025 COMPLETE CBC W/AUTO DIFF WBC: CPT | Performed by: INTERNAL MEDICINE

## 2025-07-16 PROCEDURE — 3080F DIAST BP >= 90 MM HG: CPT | Performed by: INTERNAL MEDICINE

## 2025-07-16 ASSESSMENT — PAIN SCALES - GENERAL: PAINLEVEL_OUTOF10: 0-NO PAIN

## 2025-07-16 NOTE — PATIENT INSTRUCTIONS
Recommend getting bloodwork today to check ovarian function  starting anastrozole this week assuming labs show that you are in menopause. I will send you [employer] message about results  Follow-up with Lida Reece in about 3-4 months

## 2025-07-16 NOTE — PROGRESS NOTES
Patient here with her  for follow up with Dr. King. Medications and allergies reviewed with patient.     She reports fatigue and diarrhea. She denies pain, nausea, vomiting, constipation, difficulty eating or weight loss.    Discussed Imodium and importance of hydration with patient.     Per orders patient to get labs today. Dr King will send [employer] message when results are back and send prescription for anastrozole (labs pending). She is to follow up with Lida Reece in 3-4 months.     Patient verbalized understanding using the teach back method, no further questions at this time.

## 2025-07-17 ENCOUNTER — HOSPITAL ENCOUNTER (OUTPATIENT)
Dept: RADIATION ONCOLOGY | Facility: CLINIC | Age: 55
Setting detail: RADIATION/ONCOLOGY SERIES
Discharge: HOME | End: 2025-07-17
Payer: COMMERCIAL

## 2025-07-17 VITALS
WEIGHT: 198.63 LBS | OXYGEN SATURATION: 96 % | HEART RATE: 76 BPM | SYSTOLIC BLOOD PRESSURE: 130 MMHG | DIASTOLIC BLOOD PRESSURE: 80 MMHG | RESPIRATION RATE: 18 BRPM | TEMPERATURE: 98.1 F | BODY MASS INDEX: 30.39 KG/M2

## 2025-07-17 DIAGNOSIS — C50.911 INFILTRATING DUCT AND LOBULAR CARCINOMA OF RIGHT BREAST: ICD-10-CM

## 2025-07-17 DIAGNOSIS — C50.411 MALIGNANT NEOPLASM OF UPPER-OUTER QUADRANT OF RIGHT FEMALE BREAST: ICD-10-CM

## 2025-07-17 DIAGNOSIS — Z51.0 ENCOUNTER FOR ANTINEOPLASTIC RADIATION THERAPY: ICD-10-CM

## 2025-07-17 LAB
RAD ONC MSQ ACTUAL FRACTIONS DELIVERED: 13
RAD ONC MSQ ACTUAL SESSION DELIVERED DOSE: 267 CGRAY
RAD ONC MSQ ACTUAL TOTAL DOSE: 3471 CGRAY
RAD ONC MSQ ELAPSED DAYS: 17
RAD ONC MSQ LAST DATE: NORMAL
RAD ONC MSQ PRESCRIBED FRACTIONAL DOSE: 267 CGRAY
RAD ONC MSQ PRESCRIBED NUMBER OF FRACTIONS: 15
RAD ONC MSQ PRESCRIBED TECHNIQUE: NORMAL
RAD ONC MSQ PRESCRIBED TOTAL DOSE: 4005 CGRAY
RAD ONC MSQ START DATE: NORMAL
RAD ONC MSQ TREATMENT COURSE NUMBER: 1
RAD ONC MSQ TREATMENT SITE: NORMAL

## 2025-07-17 PROCEDURE — 77412 RADIATION TX DELIVERY LVL 3: CPT | Performed by: STUDENT IN AN ORGANIZED HEALTH CARE EDUCATION/TRAINING PROGRAM

## 2025-07-17 RX ORDER — ANASTROZOLE 1 MG/1
1 TABLET ORAL DAILY
Qty: 60 TABLET | Refills: 3 | Status: SHIPPED | OUTPATIENT
Start: 2025-07-17 | End: 2026-07-17

## 2025-07-17 ASSESSMENT — ENCOUNTER SYMPTOMS
OCCASIONAL FEELINGS OF UNSTEADINESS: 0
LOSS OF SENSATION IN FEET: 0
DEPRESSION: 0

## 2025-07-17 ASSESSMENT — PAIN SCALES - GENERAL: PAINLEVEL_OUTOF10: 0-NO PAIN

## 2025-07-17 NOTE — PROGRESS NOTES
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 Faint erythema, using U[place]derly Smooth. She has occasional pruritus, and discussed hydrocortisone if needed.       OBJECTIVE:   Vital Signs:  /80   Pulse 76   Temp 36.7 °C (98.1 °F) (Temporal)   Resp 18   Wt 90.1 kg (198 lb 10.2 oz)   SpO2 96%   BMI 30.39 kg/m²     Other Pertinent Findings:     Toxicity Assessment          7/3/2025    10:06 7/10/2025    10:00 2025    10:00   Toxicity Assessment   Treatment Site Breast Breast Breast   Anorexia Grade 0 Grade 0 Grade 0   Anxiety Grade 0 Grade 0 Grade 0   Dehydration Grade 0 Grade 0 Grade 0   Depression Grade 0 Grade 0 Grade 0   Dermatitis Radiation Grade 0 Grade 1       slightly red, using udderly smooth Grade 1       udderly smooth   Diarrhea Grade 0 Grade 0 Grade 1   Fatigue Grade 1 Grade 1 Grade 2   Nausea Grade 0 Grade 0 Grade 0   Pain Grade 0 Grade 0 Grade 0   Vomiting Grade 0 Grade 0 Grade 0   Joint Range of Motion Decreased Grade 0 Grade 0 Grade 0   Joint Range of Motion Decreased Lumbar Spine Grade 0 Grade 0 Grade 0   Breast Atrophy Grade 0  Grade 0 Grade 0   Breast Pain Grade 0 Grade 0 Grade 0   Nipple Deformity Grade 0 Grade 0 Grade 0   Pneumonitis Grade 0 Grade 0    Edema Limbs Grade 0 Grade 0 Grade 0   Hot Flashes Grade 0 Grade 0 Grade 1   Lymphedema Grade 0 Grade 0 Grade 0        Assessment / Plan:  The patient is tolerating radiation therapy as anticipated.  Continue per current treatment plan.

## 2025-07-18 ENCOUNTER — HOSPITAL ENCOUNTER (OUTPATIENT)
Dept: RADIATION ONCOLOGY | Facility: CLINIC | Age: 55
Setting detail: RADIATION/ONCOLOGY SERIES
Discharge: HOME | End: 2025-07-18
Payer: COMMERCIAL

## 2025-07-18 DIAGNOSIS — C50.411 MALIGNANT NEOPLASM OF UPPER-OUTER QUADRANT OF RIGHT FEMALE BREAST: ICD-10-CM

## 2025-07-18 DIAGNOSIS — Z51.0 ENCOUNTER FOR ANTINEOPLASTIC RADIATION THERAPY: ICD-10-CM

## 2025-07-18 LAB
RAD ONC MSQ ACTUAL FRACTIONS DELIVERED: 14
RAD ONC MSQ ACTUAL SESSION DELIVERED DOSE: 267 CGRAY
RAD ONC MSQ ACTUAL TOTAL DOSE: 3738 CGRAY
RAD ONC MSQ ELAPSED DAYS: 18
RAD ONC MSQ LAST DATE: NORMAL
RAD ONC MSQ PRESCRIBED FRACTIONAL DOSE: 267 CGRAY
RAD ONC MSQ PRESCRIBED NUMBER OF FRACTIONS: 15
RAD ONC MSQ PRESCRIBED TECHNIQUE: NORMAL
RAD ONC MSQ PRESCRIBED TOTAL DOSE: 4005 CGRAY
RAD ONC MSQ START DATE: NORMAL
RAD ONC MSQ TREATMENT COURSE NUMBER: 1
RAD ONC MSQ TREATMENT SITE: NORMAL

## 2025-07-18 PROCEDURE — 77412 RADIATION TX DELIVERY LVL 3: CPT | Performed by: STUDENT IN AN ORGANIZED HEALTH CARE EDUCATION/TRAINING PROGRAM

## 2025-07-20 LAB — MIS SERPL-MCNC: <0.003 NG/ML

## 2025-07-20 ASSESSMENT — ENCOUNTER SYMPTOMS
EYES NEGATIVE: 1
APPETITE CHANGE: 0
BRUISES/BLEEDS EASILY: 0
NERVOUS/ANXIOUS: 0
EXTREMITY WEAKNESS: 0
HOT FLASHES: 0
DIARRHEA: 0
NAUSEA: 0
SEIZURES: 0
HEADACHES: 0
FREQUENCY: 0
DIZZINESS: 0
ABDOMINAL DISTENTION: 0
MYALGIAS: 0
FEVER: 0
ADENOPATHY: 0
CHILLS: 0
DIAPHORESIS: 0
SHORTNESS OF BREATH: 0
DIFFICULTY URINATING: 0
CONFUSION: 0
FATIGUE: 1
SCLERAL ICTERUS: 0
DYSURIA: 0
ARTHRALGIAS: 0
DEPRESSION: 0
SLEEP DISTURBANCE: 0
WHEEZING: 0
HEMOPTYSIS: 0
BLOOD IN STOOL: 0
PALPITATIONS: 0
COUGH: 0
LEG SWELLING: 0
CONSTIPATION: 0
NUMBNESS: 0
CHEST TIGHTNESS: 0
HEMATURIA: 0
ABDOMINAL PAIN: 0
RESPIRATORY NEGATIVE: 1
BACK PAIN: 0
CARDIOVASCULAR NEGATIVE: 1
EYE PROBLEMS: 0

## 2025-07-20 NOTE — PROGRESS NOTES
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 woman with recent diagnosis of mammographically detected multifocal stage 1 ER/NM+ and HER2- breast cancer with largest focus measuring 16 mm (other two foci measured 8 mm and 1 mm, respectively). Margins negative. 0/2 SLN. She is post-menopausal s/p hysterectomy in 2019 at age 48. No hot flushes. Pathology report makes no mention of ovaries. Here post XRT which she tolerated well.             Review of Systems   Constitutional:  Positive for fatigue. Negative for appetite change, chills, diaphoresis and fever.   HENT:  Negative.  Negative for hearing loss and lump/mass.    Eyes: Negative.  Negative for eye problems and icterus.   Respiratory: Negative.  Negative for chest tightness, cough, hemoptysis, shortness of breath and wheezing.    Cardiovascular: Negative.  Negative for chest pain, leg swelling and palpitations.   Gastrointestinal:  Negative for abdominal distention, abdominal pain, blood in stool, constipation, diarrhea and nausea.   Endocrine: Negative for hot flashes.   Genitourinary:  Negative for bladder incontinence, difficulty urinating, dyspareunia, dysuria, frequency and hematuria.    Musculoskeletal:  Negative for arthralgias, back pain, gait problem and myalgias.   Neurological:  Negative for dizziness, extremity weakness, gait problem, headaches, numbness and seizures.   Hematological:  Negative for adenopathy. Does not bruise/bleed easily.   Psychiatric/Behavioral:  Negative for confusion, depression and sleep disturbance. The patient is not nervous/anxious.    All other systems reviewed and are  negative.        Past Medical History:  has a past medical history of Breast cancer (3/2025), GERD (gastroesophageal reflux disease) (), and Hypertension ().  Surgical History:   has a past surgical history that includes  section, low transverse (,,); Hysterectomy (); Breast lumpectomy (Right, 2025); and Bladder suspension ().  Social History:   reports that she quit smoking about 21 years ago. Her smoking use included cigarettes. She started smoking about 34 years ago. She has a 19.2 pack-year smoking history. She has been exposed to tobacco smoke. She has never used smokeless tobacco. She reports that she does not drink alcohol and does not use drugs.  Family History:  Family History[1]  Family Oncology History:  Cancer-related family history includes Pancreatic cancer in her father's sister and paternal grandfather. There is no history of Ovarian cancer or Breast cancer.      OBJECTIVE    VS / Pain:  BP (!) 145/91 (BP Location: Left arm, Patient Position: Sitting, BP Cuff Size: Adult long)   Pulse 68   Temp 36.1 °C (97 °F) (Temporal)   Resp 18   Wt 90.3 kg (199 lb 1.2 oz)   SpO2 95%   BMI 30.45 kg/m²   BSA: 2.08 meters squared   Pain Scale: 0    Performance Status:  The ECOG performance scale today is ECO- Fully active, able to carry on all pre-disease performance w/o restriction.          Physical Exam  Constitutional:       General: She is not in acute distress.     Appearance: She is not ill-appearing, toxic-appearing or diaphoretic.   HENT:      Nose: No congestion or rhinorrhea.      Mouth/Throat:      Pharynx: No posterior oropharyngeal erythema.     Cardiovascular:      Rate and Rhythm: Normal rate and regular rhythm.      Pulses: Normal pulses.      Heart sounds: Normal heart sounds. No murmur heard.     No gallop.   Pulmonary:      Breath sounds: Normal breath sounds.   Abdominal:      General: There is no distension.      Palpations: There is no mass.       "Tenderness: There is no abdominal tenderness.     Musculoskeletal:         General: No swelling.      Cervical back: No rigidity.      Right lower leg: No edema.      Left lower leg: No edema.   Lymphadenopathy:      Cervical: No cervical adenopathy.     Skin:     General: Skin is warm.      Coloration: Skin is not cyanotic.      Findings: No bruising, ecchymosis or erythema.     Neurological:      General: No focal deficit present.      Mental Status: She is oriented to person, place, and time.      Cranial Nerves: Cranial nerves 2-12 are intact.      Motor: Motor function is intact.     Psychiatric:         Attention and Perception: Attention and perception normal.         Mood and Affect: Mood and affect normal.         Behavior: Behavior normal.         Thought Content: Thought content normal.         Judgment: Judgment normal.           Diagnostic Results   === 11/19/24 ===    CT CARDIAC SCORING WO IV CONTRAST    Addendum 11/21/2024  6:54 PM -----------------------------------------------  Interpreted By:  Ty Najera,  ADDENDUM:  An approximate 1.8 cm hypodensity of fluid collection is present  along the right cardiac margin most consistent with a bronchogenic  cyst.    Signed by: Ty Najera 11/21/2024 6:54 PM    -------- ORIGINAL REPORT --------  Dictation workstation:   [id number]    - Impression -  Coronary artery calcium score of  0.  Please note that up to 5% of patients with a negative exam could have  significant noncalcified plaque, and therefore have a falsely \"  negative\" CT coronary calcium score exam.        Coronary artery calcium scoring may be helpful in predicting the risk  for future coronary heart disease events.  According to the American  College of Cardiology Foundation Clinical Expert Consensus Task  Force, such testing provides important prognostic information in  patients with more than one coronary heart disease risk factor. The  coronary artery calcium score correlates with the " "annual risk of a  non-fatal myocardial infarction or coronary heart disease death.    Coronary artery score            Annual Risk    0-99                               0.4%  100-399                          1.3%  >400                              2.4%    These three \"breakpoints\" correspond to lower, intermediate and high  risk states for future coronary events.  Such information should be  used, along with appropriate clinical judgment, to make decisions  regarding the intensity of risk factor management strategies to treat  blood lipids and to modify other non-lipid coronary risk factors.    Reference: Ruth P et al. Circulation.  2007; 115:402-426    Signed by: Ty Najera 11/21/2024 6:51 PM  Dictation workstation:   [id number]      BI mammo right diagnostic tomosynthesis 03/07/2025    Narrative  Interpreted By:  Jerad Rincon,  STUDY:  BI MAMMO RIGHT DIAGNOSTIC TOMOSYNTHESIS;  3/7/2025 9:08 am    ACCESSION NUMBER(S):  [id number]    ORDERING CLINICIAN:  ALVARADO NORRIS    INDICATION:  Preprocedural diagnostic mammogram of the right breast to evaluate  the anterior extent of right breast calcifications and a possible  anterior right breast mass circled on exam dated 12/13/2024. The  patient had recent biopsy-proven right breast invasive ductal  carcinoma at 11 o'clock 7 cm from the nipple as well as atypical  glandular cells which were stereotactically biopsied with a right  breast mass and associated microcalcifications.    ,C50.411 Malignant neoplasm of upper-outer quadrant of right female  breast,Z17.0 Estrogen receptor positive status (ER+)    COMPARISON:  12/13/2024, 01/06/2025 and 01/28/2025.    FINDINGS:  MAMMOGRAPHY: 2D and tomosynthesis images were reviewed at 1 mm slice  thickness.    Density:  There are scattered areas of fibroglandular density.    The mass/focal asymmetry suggested within the slightly lateral,  slightly superior right breast at anterior depth which was circled on  exam dated " 12/13/2024 resolves into fibroglandular and fatty breast  tissue on today's additional views. Spot magnification views of the  upper-outer quadrant demonstrate segmental pleomorphic calcifications  extending anteriorly from the patient's biopsy-proven right breast  malignancy and measuring up to 3.9 cm in AP diameter.    Impression  Suspicious right breast calcifications. Plan is to proceed with the  patient's scheduled same day biopsy of the anterior most extent of  the calcifications. The findings and recommendations were discussed  with the patient prior to the biopsy by Dr. Jerad Rincon.    Resolved right breast mass/focal asymmetry.    BI-RADS CATEGORY:  BI-RADS Category:  4 Suspicious.  Recommendation:  Surgical Consultation and Biopsy.  Recommended Date:  Immediate.  Laterality:  Right.    For any future breast imaging appointments, please call [phone]).    MACRO:  None    Signed by: Jerad Rincon 3/7/2025 10:20 AM  Dictation workstation:   [id number]     No images are attached to the encounter.    LABORATORY/PATHOLOGY DATA    Hospital Outpatient Visit on 07/16/2025   Component Date Value Ref Range Status    Treatment Site 07/16/2025 R breast   Final    Course Number 07/16/2025 1   Final    Prescribed Fractional Dose 07/16/2025 267  cGray Final    Prescribed Total Dose 07/16/2025 4,005  cGray Final    Actual Fractions Delivered 07/16/2025 12   Final    Actual Session Delivered Dose 07/16/2025 267  cGray Final    Actual Total Dose 07/16/2025 3,204  cGray Final    Prescribed Technique 07/16/2025 3D   Final    Elapsed Days 07/16/2025 16   Final    Start Date 07/16/2025 6/30/2025   Final    Last Date 07/16/2025 7/16/2025   Final    Prescribed Number of Fractions 07/16/2025 15   Final   Office Visit on 07/16/2025   Component Date Value Ref Range Status    Estradiol 07/16/2025 <19  pg/mL Final    Follicle Stimulating Hormone 07/16/2025 82.7  IU/L Final    Luteinizing Hormone 07/16/2025 48.4  IU/L  Final    Glucose 07/16/2025 117 (H)  74 - 99 mg/dL Final    Sodium 07/16/2025 143  136 - 145 mmol/L Final    Potassium 07/16/2025 3.8  3.5 - 5.3 mmol/L Final    Chloride 07/16/2025 107  98 - 107 mmol/L Final    Bicarbonate 07/16/2025 29  21 - 32 mmol/L Final    Anion Gap 07/16/2025 11  10 - 20 mmol/L Final    Urea Nitrogen 07/16/2025 20  6 - 23 mg/dL Final    Creatinine 07/16/2025 0.60  0.50 - 1.05 mg/dL Final    eGFR 07/16/2025 >90  >60 mL/min/1.73m*2 Final    Calcium 07/16/2025 9.2  8.6 - 10.3 mg/dL Final    Albumin 07/16/2025 4.1  3.4 - 5.0 g/dL Final    Alkaline Phosphatase 07/16/2025 96  33 - 110 U/L Final    Total Protein 07/16/2025 6.5  6.4 - 8.2 g/dL Final    AST 07/16/2025 13  9 - 39 U/L Final    Bilirubin, Total 07/16/2025 0.4  0.0 - 1.2 mg/dL Final    ALT 07/16/2025 14  7 - 45 U/L Final    WBC 07/16/2025 5.9  4.4 - 11.3 x10*3/uL Final    nRBC 07/16/2025 0.0  0.0 - 0.0 /100 WBCs Final    RBC 07/16/2025 4.65  4.00 - 5.20 x10*6/uL Final    Hemoglobin 07/16/2025 14.3  12.0 - 16.0 g/dL Final    Hematocrit 07/16/2025 41.8  36.0 - 46.0 % Final    MCV 07/16/2025 90  80 - 100 fL Final    MCH 07/16/2025 30.8  26.0 - 34.0 pg Final    MCHC 07/16/2025 34.2  32.0 - 36.0 g/dL Final    RDW 07/16/2025 12.8  11.5 - 14.5 % Final    Platelets 07/16/2025 208  150 - 450 x10*3/uL Final    Neutrophils % 07/16/2025 73.3  40.0 - 80.0 % Final    Immature Granulocytes %, Automated 07/16/2025 0.2  0.0 - 0.9 % Final    Lymphocytes % 07/16/2025 16.5  13.0 - 44.0 % Final    Monocytes % 07/16/2025 6.5  2.0 - 10.0 % Final    Eosinophils % 07/16/2025 3.2  0.0 - 6.0 % Final    Basophils % 07/16/2025 0.3  0.0 - 2.0 % Final    Neutrophils Absolute 07/16/2025 4.30  1.20 - 7.70 x10*3/uL Final    Immature Granulocytes Absolute, Au* 07/16/2025 0.01  0.00 - 0.70 x10*3/uL Final    Lymphocytes Absolute 07/16/2025 0.97 (L)  1.20 - 4.80 x10*3/uL Final    Monocytes Absolute 07/16/2025 0.38  0.10 - 1.00 x10*3/uL Final    Eosinophils Absolute 07/16/2025  0.19  0.00 - 0.70 x10*3/uL Final    Basophils Absolute 07/16/2025 0.02  0.00 - 0.10 x10*3/uL Final   Hospital Outpatient Visit on 07/15/2025   Component Date Value Ref Range Status    Treatment Site 07/15/2025 R breast   Final    Course Number 07/15/2025 1   Final    Prescribed Fractional Dose 07/15/2025 267  cGray Final    Prescribed Total Dose 07/15/2025 4,005  cGray Final    Actual Fractions Delivered 07/15/2025 11   Final    Actual Session Delivered Dose 07/15/2025 267  cGray Final    Actual Total Dose 07/15/2025 2,937  cGray Final    Prescribed Technique 07/15/2025 3D   Final    Elapsed Days 07/15/2025 15   Final    Start Date 07/15/2025 6/30/2025   Final    Last Date 07/15/2025 7/15/2025   Final    Prescribed Number of Fractions 07/15/2025 15   Final   Hospital Outpatient Visit on 07/14/2025   Component Date Value Ref Range Status    Treatment Site 07/14/2025 R breast   Final    Course Number 07/14/2025 1   Final    Prescribed Fractional Dose 07/14/2025 267  cGray Final    Prescribed Total Dose 07/14/2025 4,005  cGray Final    Actual Fractions Delivered 07/14/2025 10   Final    Actual Session Delivered Dose 07/14/2025 267  cGray Final    Actual Total Dose 07/14/2025 2,670  cGray Final    Prescribed Technique 07/14/2025 3D   Final    Elapsed Days 07/14/2025 14   Final    Start Date 07/14/2025 6/30/2025   Final    Last Date 07/14/2025 7/14/2025   Final    Prescribed Number of Fractions 07/14/2025 15   Final   Hospital Outpatient Visit on 07/11/2025   Component Date Value Ref Range Status    Treatment Site 07/11/2025 R breast   Final    Course Number 07/11/2025 1   Final    Prescribed Fractional Dose 07/11/2025 267  cGray Final    Prescribed Total Dose 07/11/2025 4,005  cGray Final    Actual Fractions Delivered 07/11/2025 9   Final    Actual Session Delivered Dose 07/11/2025 267  cGray Final    Actual Total Dose 07/11/2025 2,403  cGray Final    Prescribed Technique 07/11/2025 3D   Final    Elapsed Days 07/11/2025  11   Final    Start Date 07/11/2025 6/30/2025   Final    Last Date 07/11/2025 7/11/2025   Final    Prescribed Number of Fractions 07/11/2025 15   Final   Hospital Outpatient Visit on 07/10/2025   Component Date Value Ref Range Status    Treatment Site 07/10/2025 R breast   Final    Course Number 07/10/2025 1   Final    Prescribed Fractional Dose 07/10/2025 267  cGray Final    Prescribed Total Dose 07/10/2025 4,005  cGray Final    Actual Fractions Delivered 07/10/2025 8   Final    Actual Session Delivered Dose 07/10/2025 267  cGray Final    Actual Total Dose 07/10/2025 2,136  cGray Final    Prescribed Technique 07/10/2025 3D   Final    Elapsed Days 07/10/2025 10   Final    Start Date 07/10/2025 6/30/2025   Final    Last Date 07/10/2025 7/10/2025   Final    Prescribed Number of Fractions 07/10/2025 15   Final   Hospital Outpatient Visit on 07/09/2025   Component Date Value Ref Range Status    Treatment Site 07/09/2025 R breast   Final    Course Number 07/09/2025 1   Final    Prescribed Fractional Dose 07/09/2025 267  cGray Final    Prescribed Total Dose 07/09/2025 4,005  cGray Final    Actual Fractions Delivered 07/09/2025 7   Final    Actual Session Delivered Dose 07/09/2025 267  cGray Final    Actual Total Dose 07/09/2025 1,869  cGray Final    Prescribed Technique 07/09/2025 3D   Final    Elapsed Days 07/09/2025 9   Final    Start Date 07/09/2025 6/30/2025   Final    Last Date 07/09/2025 7/9/2025   Final    Prescribed Number of Fractions 07/09/2025 15   Final   Hospital Outpatient Visit on 07/08/2025   Component Date Value Ref Range Status    Treatment Site 07/08/2025 R breast   Final    Course Number 07/08/2025 1   Final    Prescribed Fractional Dose 07/08/2025 267  cGray Final    Prescribed Total Dose 07/08/2025 4,005  cGray Final    Actual Fractions Delivered 07/08/2025 6   Final    Actual Session Delivered Dose 07/08/2025 267  cGray Final    Actual Total Dose 07/08/2025 1,602  cGray Final    Prescribed Technique  07/08/2025 3D   Final    Elapsed Days 07/08/2025 8   Final    Start Date 07/08/2025 6/30/2025   Final    Last Date 07/08/2025 7/8/2025   Final    Prescribed Number of Fractions 07/08/2025 15   Final   Hospital Outpatient Visit on 07/07/2025   Component Date Value Ref Range Status    Treatment Site 07/07/2025 R breast   Final    Course Number 07/07/2025 1   Final    Prescribed Fractional Dose 07/07/2025 267  cGray Final    Prescribed Total Dose 07/07/2025 4,005  cGray Final    Actual Fractions Delivered 07/07/2025 5   Final    Actual Session Delivered Dose 07/07/2025 267  cGray Final    Actual Total Dose 07/07/2025 1,335  cGray Final    Prescribed Technique 07/07/2025 3D   Final    Elapsed Days 07/07/2025 7   Final    Start Date 07/07/2025 6/30/2025   Final    Last Date 07/07/2025 7/7/2025   Final    Prescribed Number of Fractions 07/07/2025 15   Final   Hospital Outpatient Visit on 07/03/2025   Component Date Value Ref Range Status    Treatment Site 07/03/2025 R breast   Final    Course Number 07/03/2025 1   Final    Prescribed Fractional Dose 07/03/2025 267  cGray Final    Prescribed Total Dose 07/03/2025 4,005  cGray Final    Actual Fractions Delivered 07/03/2025 4   Final    Actual Session Delivered Dose 07/03/2025 267  cGray Final    Actual Total Dose 07/03/2025 1,068  cGray Final    Prescribed Technique 07/03/2025 3D   Final    Elapsed Days 07/03/2025 3   Final    Start Date 07/03/2025 6/30/2025   Final    Last Date 07/03/2025 7/3/2025   Final    Prescribed Number of Fractions 07/03/2025 15   Final   There may be more visits with results that are not included.                IMPRESSION/PLAN    Multifocal pT1cN0 ER/SD+ HER2- breast cancer oncotype dx of 23.  Patient is open to taking anastrozole. Labs from today confirm post-menopausal status. RTC in about  8-12 wks for survivorship visit. .        We discussed the clinical significance of diagnosis, goals of care and treatment plan in detail.     I personally  spent over half of a total 35 minutes face to face with the patient in counseling and discussion and/or coordination of care as described above.         -------------------------------------------------------------------------------------------------------------------------------  Nicho King MD  Director of Breast Cancer Medical Oncology Research Program   [facility]  Professor of Medicine  [address]  [place] [id number]  Phone: [phone]  [email or web address]               [1]   Family History  Problem Relation Name Age of Onset    Epilepsy Mother      Other (bladder cancer) Father Chago Patel     Diabetes Father Chago Patel     Pancreatic cancer Father's Sister      Pancreatic cancer Paternal Grandfather      Ovarian cancer Neg Hx      Breast cancer Neg Hx

## 2025-07-21 ENCOUNTER — HOSPITAL ENCOUNTER (OUTPATIENT)
Dept: RADIATION ONCOLOGY | Facility: CLINIC | Age: 55
Setting detail: RADIATION/ONCOLOGY SERIES
Discharge: HOME | End: 2025-07-21
Payer: COMMERCIAL

## 2025-07-21 DIAGNOSIS — C50.411 MALIGNANT NEOPLASM OF UPPER-OUTER QUADRANT OF RIGHT FEMALE BREAST: ICD-10-CM

## 2025-07-21 DIAGNOSIS — Z51.0 ENCOUNTER FOR ANTINEOPLASTIC RADIATION THERAPY: ICD-10-CM

## 2025-07-21 LAB
RAD ONC MSQ ACTUAL FRACTIONS DELIVERED: 15
RAD ONC MSQ ACTUAL SESSION DELIVERED DOSE: 267 CGRAY
RAD ONC MSQ ACTUAL TOTAL DOSE: 4005 CGRAY
RAD ONC MSQ ELAPSED DAYS: 21
RAD ONC MSQ LAST DATE: NORMAL
RAD ONC MSQ PRESCRIBED FRACTIONAL DOSE: 267 CGRAY
RAD ONC MSQ PRESCRIBED NUMBER OF FRACTIONS: 15
RAD ONC MSQ PRESCRIBED TECHNIQUE: NORMAL
RAD ONC MSQ PRESCRIBED TOTAL DOSE: 4005 CGRAY
RAD ONC MSQ START DATE: NORMAL
RAD ONC MSQ TREATMENT COURSE NUMBER: 1
RAD ONC MSQ TREATMENT SITE: NORMAL

## 2025-07-21 PROCEDURE — 77412 RADIATION TX DELIVERY LVL 3: CPT | Performed by: STUDENT IN AN ORGANIZED HEALTH CARE EDUCATION/TRAINING PROGRAM

## 2025-07-22 ENCOUNTER — DOCUMENTATION (OUTPATIENT)
Dept: RADIATION ONCOLOGY | Facility: CLINIC | Age: 55
End: 2025-07-22
Payer: COMMERCIAL

## 2025-07-22 NOTE — PROGRESS NOTES
Radiation Oncology Treatment Summary    Patient Name:  Makenna Reyes  MRN:  [id number]  :  1970    Referring Provider: No ref. provider found  Primary Care Provider: Alphonse Chapman DO    Brief History: Makenna Reyes is a 55 y.o. 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  The patient completed radiotherapy as outlined below.    Radiation Treatment Summary:    Radiation Therapy    Treatment Period Technique Fraction Dose Fractions Total Dose   Course 1 2025-2025  (days elapsed: 21)         R breast 2025-2025 3D 267 / 267 cGy 15 / 15 4,005 / 4,005 cGy       Please see the patient's [facility] chart for further details regarding the radiation plan, including beam energy.    Concurrent Chemotherapy:  Treatment Plans       No treatment plans exist          CTCAE Toxicity Overview:   Toxicity Assessment          7/3/2025    10:06 7/10/2025    10:00 2025    10:00   Toxicity Assessment   Treatment Site Breast Breast Breast   Anorexia Grade 0 Grade 0 Grade 0   Anxiety Grade 0 Grade 0 Grade 0   Dehydration Grade 0 Grade 0 Grade 0   Depression Grade 0 Grade 0 Grade 0   Dermatitis Radiation Grade 0 Grade 1       slightly red, using udderly smooth Grade 1       udderly smooth   Diarrhea Grade 0 Grade 0 Grade 1   Fatigue Grade 1 Grade 1 Grade 2   Nausea Grade 0 Grade 0 Grade 0   Pain Grade 0 Grade 0 Grade 0   Vomiting Grade 0 Grade 0 Grade 0   Joint Range of Motion Decreased Grade 0 Grade 0 Grade 0   Joint Range of Motion Decreased Lumbar Spine Grade 0 Grade 0 Grade 0   Breast Atrophy Grade 0 Grade 0 Grade 0   Breast Pain Grade 0 Grade 0 Grade 0   Nipple Deformity Grade 0 Grade 0 Grade 0   Pneumonitis Grade 0 Grade 0    Edema Limbs Grade 0 Grade 0 Grade 0   Hot Flashes Grade 0  Grade 0 Grade 1   Lymphedema Grade 0 Grade 0 Grade 0     Patient Disposition: Per Dr. Smith patient will follow up in 6-8 weeks.

## 2025-07-22 NOTE — PROGRESS NOTES
History of Present Illness:  Makenna Reyes is a 55 y.o. female with a personal history of breast cancer, and a family history of pancreatic cancer.  Makenna Reyes was referred to the Cancer [facility]etics [facility] at [facility] by Shannan Cohen MD. Makenna Reyes is interested in genetic testing to clarify her personal risk for cancer, as well as the risks to her family members. ***EAMHEREWITH      Cancer Medical History:  Personal history of cancer? {YES,NO:96794}  Type:     Stage IA multi centric right breast cancer diagnosed 1/28/2025.              -[id number]              Invasive ductal carcinoma, grade 2 with DCIS; ER >95%, MO 5%, HER2 negative; 1.6 cm  invasive ductal carcinoma, grade 1 with DCIS; ER 81-90%, MO 61-70%, HER2 negative; 0.1 cm  invasive carcinoma with ductal and lobular features, grade 1 with DCIS, ER %, MO %, HER2 negative; 0.8 cm     Age at diagnosis: ***  Summary:     - Surgery (lumpectomy) completed 4/2/25  - Completing radiation  - Then anastrozole    History of other cancers: No    Prior hereditary cancer (germline) genetic testing? {YES,NO:44516}    Prior hereditary cancer (germline) genetic testing in family members? {YES,NO:63352}      Cancer screening history:  Mammograms? {Yes, most recent or No:18480}    Patient {Denies, Reports:394489} personal history of breast biopsy. ***  PAP smear? {Yes, most recent or No:59265}   Colonoscopy? {Yes, most recent or No:11553}  Patient {Denies, Reports:627776} personal history of colorectal polyps.   Patient reports a total of  # colonoscopies, and reports a cumulative poly count of #.   Upper endoscopy? {Yes, most recent or No:07388}   Dermatology? {Yes, most recent or No:10550}   Other cancer screening? ***    Reproductive History:  Number of children: {EAMOPTIONS:77131}  Number of pregnancies: {EAMOPTIONS:49047}  Age first birth: {EAMOPTIONS:70263}  Breast feeding? {EAMOPTIONS:81928}  Menarche (age): {EAMOPTIONS:82453}  Menopause (age):  {EAMOPTIONS:34431}  OCP: {EAMOCPUSE:52012}  HRT: {EAMOPTIONS:01325}    Hysterectomy? {Yes- *** No N/A:97428}  Oophorectomy? {Yes- *** No N/A:92975}      Family history:  A 4-generation pedigree was obtained and was significant for the following:     - Paternal aunt and paternal grandfather with pancreatic cancer    Maternal ancestry is ***.  Paternal ancestry is ***. There is {maternal\paternal\no known:40031} Ashkenazi Anab[employer]ism ancestry. Consanguinity was denied.       Genetic counseling:    Summary  Ms. Reyes is a 55 y.o. female with {EAMCANCERHPI:79100}.    Makenna Reyes's reported history is concerning for possible hereditary {EAMCANCERTYPE:12334} cancer. Ms. Reyes meets current national (NCCN) criteria for testing of {EAMNCCNTESTINGFOR:37907}    Testing is medically necessary, as it will help determine if Ms. Reyes is a candidate for {EAMMEDNEC:09266}    Makenna is interested in testing, which is recommended, and was ordered today via the {EAMPANELS:98823}. Our discussion is summarized below.    Genetic Counseling Discussion    We reviewed genes and chromosomes, inherited forms of {EAMCANCERTYPE:99582} cancer.  We discussed that most cancers are not due to an inherited genetic susceptibility.  However, in about 5-10% of families, there is an inherited genetic mutation that can make a person more susceptible to developing certain forms of cancer.  Within families with a genetic predisposition to cancer, we often see certain patterns, such as multiple family members with cancer and cancers occurring in multiple generations. In addition, earlier onset and/or bilateral cancers are suggestive of an inherited predisposition. There also tends to be a clustering of certain types of related cancer in these families, such as breast and ovarian cancers, or colon and uterine cancers.     {EAMINDICATION:15691}    We discussed performing testing as part of a multi-gene panel that looks at high and moderate penetrance genes. Some  genes are considered highly penetrant cancer genes, meaning a mutation in the gene confers a high risk of cancer. On the other hand, there are other intermediate (moderate risk) cancer genes. For many of the moderate risk genes, there may be limited information regarding the degree to which a mutation in the gene affects risk of different types of cancers. Additionally, for some of these moderate risk genes, the appropriate management for individuals who have a mutation in one of these genes is not always clear. In many cases, even if an individual tests positive for a mutation in a moderate risk gene, recommendations are still based on the family history, not the positive test result.  Additionally, unexpected findings may occur, where a mutation is identified that confers a risk for a cancer not seen in the family history.  Lastly, in the era of multigene panel testing, we know that more than one pathogenic or likely pathogenic variant can be identified in any one individual and/or family    Makenna Reyes was counseled about hereditary cancer susceptibility including cancer risks, options for increased screening and/or risk reduction, genetic testing, and the implications for other family members.  We discussed different panel options available to Ms. Reyes. After a discussion about the risks, benefits, and limitations of genetic testing, Makenna Reyes elected to undergo genetic testing for hereditary cancer using the panel described above. Makenna Reyes gave {[facility] Network[facility]ENT[facility]YPES:38295} consent to proceed with testing.    We reviewed the types of results we can get back:   - A positive result means that we identified a change in a gene that confers an increased cancer risk for cancer. We will discuss potential changes in management for her and her family based on the specific gene mutation found.    - A negative (normal) result clears her for many cancer predisposition syndromes, but cannot clear her for any/all cancer  predisposition risks. She would continue to be screened based on her personal and family history.    - A Variant of Uncertain Significance (VUS) means that some kind of change was found in a gene, but it is currently unknown whether this specific change is harmful.  These results do not  recommendations or warrant familial variant testing.    We reviewed the limitations of testing an unaffected individual for a hereditary cancer syndrome. When testing an individual who has not had a cancer diagnosis, like Ms. Reyes, a negative test result may have limited utility in defining future cancer risks, as it is unclear whether the affected family members had a mutation that the unaffected individual did not inherit, or alternatively, whether the family history of cancer is due to unidentifiable risk factors that are shared between the unaffected individual and their family members. Testing for her *** would be most informative for Ms. Reyes's {[facility]:73379} cancer risk. Ms. Reyes stated that her understanding of these limitations ***and that testing for her *** is not feasible at this time, we discussed that testing for Ms. Reyes would certainly be appropriate.    We discussed the Genetic Information Nondiscrimination Act (REGGIE). We discussed the protections and limitations of REGGIE. REGGIE generally makes in illegal for health insurance companies, group health plans, and most employers (with >15 employees) to discriminate based on genetic information. It does not protect against genetic discrimination for life insurance, disability insurance, long-term care, or other insurances.    Ms. Reyes will return to the Cancer Genetics Clinic to discuss her testing results.  At that time, we will make recommendations for both Ms. Reyes and their family members in terms of cancer screening and/or cancer risk reduction options. Ms. Reyes was reminded to follow her primary care providers' recommendations for  age-related cancer screenings, such as mammograms, colonoscopies, etc.    We discussed that Makenna Reyes's test results may be released to her chart when they are reported. Most people choose to review the results with their provider at their follow up visit. However, if she chooses to view her results in advance of her visit, the provider may not be available to discuss. Makenna Reyes was advised to keep her follow-up appointment regardless of her results to discuss appropriate management and referrals.      PLAN:  Ms. Reyes elected to undergo genetic testing for hereditary cancer using the {EAMPANELS:27443} panel.  Makenna Reyes gave their {EAMCONSENTTYPES:34027} consent to proceed with testing.    {EAMSAMPLEOPTIONS:30750}  {EAMEPICORDER:61643}  Results are typically available within 3-4 weeks from the time of sample reception. Ms. Reyes will return to the Cancer [facility]etics Clinic discuss her test results.  {EAMSCHEDULEFUV:09752}  We remain available to Ms. Reyes at [phone] if any questions arise regarding information discussed at today's visit.    {EAMSIGNOFF:19573}

## 2025-07-22 NOTE — PROGRESS NOTES
Patient finished 15fx to the right breast on 7.21.25. Per Dr. Smith patient will follow up in 6-8 weeks. Patient given and reviewed What you need to know after radiation. We reviewed side effects of radiation and how the effects can linger for 1-2 weeks. Patient instructed to call with questions or concerns. Patient verbalizes understanding with verbal teach back.

## 2025-07-25 ENCOUNTER — APPOINTMENT (OUTPATIENT)
Dept: GENETICS | Facility: HOSPITAL | Age: 55
End: 2025-07-25
Payer: COMMERCIAL

## 2025-08-18 ENCOUNTER — TELEMEDICINE (OUTPATIENT)
Dept: PRIMARY CARE | Facility: CLINIC | Age: 55
End: 2025-08-18
Payer: COMMERCIAL

## 2025-08-18 DIAGNOSIS — U07.1 COVID-19: Primary | ICD-10-CM

## 2025-08-18 PROCEDURE — 99213 OFFICE O/P EST LOW 20 MIN: CPT | Performed by: NURSE PRACTITIONER

## 2025-08-18 ASSESSMENT — ENCOUNTER SYMPTOMS
TROUBLE SWALLOWING: 0
FEVER: 0
DIARRHEA: 0
COUGH: 1
DIZZINESS: 0
VOMITING: 0
MYALGIAS: 1
WHEEZING: 0
SHORTNESS OF BREATH: 0
APPETITE CHANGE: 1
WEAKNESS: 0
DIAPHORESIS: 0
HEADACHES: 0
NAUSEA: 0
FATIGUE: 1
CHILLS: 0
ACTIVITY CHANGE: 0
SORE THROAT: 0
LIGHT-HEADEDNESS: 0

## 2025-09-05 ENCOUNTER — HOSPITAL ENCOUNTER (OUTPATIENT)
Dept: RADIATION ONCOLOGY | Facility: CLINIC | Age: 55
Setting detail: RADIATION/ONCOLOGY SERIES
Discharge: HOME | End: 2025-09-05
Payer: COMMERCIAL

## 2025-09-05 VITALS
HEART RATE: 86 BPM | DIASTOLIC BLOOD PRESSURE: 70 MMHG | BODY MASS INDEX: 30.52 KG/M2 | RESPIRATION RATE: 18 BRPM | WEIGHT: 199.52 LBS | SYSTOLIC BLOOD PRESSURE: 132 MMHG | TEMPERATURE: 96.4 F | OXYGEN SATURATION: 95 %

## 2025-09-05 DIAGNOSIS — C50.911 INFILTRATING DUCT AND LOBULAR CARCINOMA OF RIGHT BREAST: ICD-10-CM

## 2025-09-05 PROCEDURE — 99211 OFF/OP EST MAY X REQ PHY/QHP: CPT | Performed by: STUDENT IN AN ORGANIZED HEALTH CARE EDUCATION/TRAINING PROGRAM

## 2025-09-05 ASSESSMENT — PAIN SCALES - GENERAL: PAINLEVEL_OUTOF10: 0-NO PAIN

## 2025-09-05 ASSESSMENT — ENCOUNTER SYMPTOMS
OCCASIONAL FEELINGS OF UNSTEADINESS: 0
DEPRESSION: 0
LOSS OF SENSATION IN FEET: 0

## 2025-12-03 ENCOUNTER — APPOINTMENT (OUTPATIENT)
Dept: RADIOLOGY | Facility: HOSPITAL | Age: 55
End: 2025-12-03
Payer: COMMERCIAL

## (undated) DEVICE — APPLIER, MULTIPLE CLIP, LIGACLIP, W/20 MEDIUM, 11.5 APPLIER

## (undated) DEVICE — SOLUTION, IRRIGATION, X RX SODIUM CHL 0.9%, 1000ML BTL

## (undated) DEVICE — DRAPE, LEGGINGS, 28.5 X 43 IN, DISPOSABLE, LF, STERILE

## (undated) DEVICE — SLEEVE, VASO PRESS, CALF GARMENT, MEDIUM, GREEN

## (undated) DEVICE — STRIP, SKIN CLOSURE, STERI STRIP, REINFORCED, 0.5 X 4 IN

## (undated) DEVICE — GLOVE, SURGICAL, PROTEXIS PI BLUE W/NEUTHERA, 6.5, PF, LF

## (undated) DEVICE — CLIP, LIGATING, LIGACLIP EXTRA, SMALL, 26 MM, TITANIUM

## (undated) DEVICE — BLADE, SURGICAL, POLYMER COATED P15, STERILE, DISP

## (undated) DEVICE — DISSECTOR, EXACT, LIGASURE

## (undated) DEVICE — Device

## (undated) DEVICE — SUTURE, VICRYL, 3-0, 27 IN, SH

## (undated) DEVICE — ELECTRODE, ELECTROSURGICAL, BLADE, INSULATED, ENT/IMA, STERILE

## (undated) DEVICE — SUTURE, SILK, 3-0, 30 IN, BR SH, BLACK

## (undated) DEVICE — GLOVE, SURGICAL, PROTEXIS PI , 6.5, PF, LF

## (undated) DEVICE — APPLICATOR, CHLORAPREP, W/ORANGE TINT, 26ML

## (undated) DEVICE — CAUTERY, PENCIL, PUSH BUTTON, SMOKE EVAC, 70MM

## (undated) DEVICE — PROBE COVER, INTRAOPERATIVE, 13 X 244CM (5 X 96IN)

## (undated) DEVICE — SUTURE, MONOCRYL, 4-0, 27 IN, PS-2, UNDYED

## (undated) DEVICE — KIT, MARGINMARKER, 6 INK COLORS, STANDARD